# Patient Record
Sex: MALE | Race: WHITE | NOT HISPANIC OR LATINO | Employment: FULL TIME | URBAN - METROPOLITAN AREA
[De-identification: names, ages, dates, MRNs, and addresses within clinical notes are randomized per-mention and may not be internally consistent; named-entity substitution may affect disease eponyms.]

---

## 2017-04-13 ENCOUNTER — OFFICE VISIT (OUTPATIENT)
Dept: FAMILY MEDICINE | Facility: CLINIC | Age: 47
End: 2017-04-13
Payer: COMMERCIAL

## 2017-04-13 VITALS
BODY MASS INDEX: 27.5 KG/M2 | HEART RATE: 100 BPM | WEIGHT: 203 LBS | TEMPERATURE: 98.6 F | DIASTOLIC BLOOD PRESSURE: 66 MMHG | HEIGHT: 72 IN | SYSTOLIC BLOOD PRESSURE: 98 MMHG

## 2017-04-13 DIAGNOSIS — Z13.6 CARDIOVASCULAR SCREENING; LDL GOAL LESS THAN 130: Chronic | ICD-10-CM

## 2017-04-13 DIAGNOSIS — H81.10 BPPV (BENIGN PAROXYSMAL POSITIONAL VERTIGO), UNSPECIFIED LATERALITY: Primary | ICD-10-CM

## 2017-04-13 DIAGNOSIS — R03.1 LOW BLOOD PRESSURE READING: ICD-10-CM

## 2017-04-13 LAB
ERYTHROCYTE [DISTWIDTH] IN BLOOD BY AUTOMATED COUNT: 13.4 % (ref 10–15)
HCT VFR BLD AUTO: 47.7 % (ref 40–53)
HGB BLD-MCNC: 16.4 G/DL (ref 13.3–17.7)
MCH RBC QN AUTO: 30.6 PG (ref 26.5–33)
MCHC RBC AUTO-ENTMCNC: 34.4 G/DL (ref 31.5–36.5)
MCV RBC AUTO: 89 FL (ref 78–100)
PLATELET # BLD AUTO: 205 10E9/L (ref 150–450)
RBC # BLD AUTO: 5.36 10E12/L (ref 4.4–5.9)
WBC # BLD AUTO: 8.2 10E9/L (ref 4–11)

## 2017-04-13 PROCEDURE — 80061 LIPID PANEL: CPT | Performed by: FAMILY MEDICINE

## 2017-04-13 PROCEDURE — 99214 OFFICE O/P EST MOD 30 MIN: CPT | Performed by: FAMILY MEDICINE

## 2017-04-13 PROCEDURE — 85027 COMPLETE CBC AUTOMATED: CPT | Performed by: FAMILY MEDICINE

## 2017-04-13 PROCEDURE — 80048 BASIC METABOLIC PNL TOTAL CA: CPT | Performed by: FAMILY MEDICINE

## 2017-04-13 PROCEDURE — 84443 ASSAY THYROID STIM HORMONE: CPT | Performed by: FAMILY MEDICINE

## 2017-04-13 PROCEDURE — 36415 COLL VENOUS BLD VENIPUNCTURE: CPT | Performed by: FAMILY MEDICINE

## 2017-04-13 NOTE — PATIENT INSTRUCTIONS
Benign Paroxysmal Positional Vertigo  Benign paroxysmal positional vertigo (BPPV) is a problem with the inner ear. The inner ear contains the vestibular system. This system is what helps you keep your balance. BPPV causes a feeling of spinning. It is a common problem of the vestibular system.  Understanding the vestibular system  The vestibular system of the ear is made up of very tiny parts. They include the utricle, saccule, and semicircular canals. The utricle is a tiny organ that contains calcium crystals. In some people, the crystals can move into the semicircular canals. When this happens, the system no longer works as it should. This causes BPPV. Benign means it is not life-threatening. Paroxysmal means it happens suddenly. Positional means that it happens when you move your head. Vertigo is a feeling of spinning.  What causes BPPV?  Causes include injury to your head or neck. Other problems with the vestibular system may cause BPPV. In many people, the cause of BPPV is not known.  Symptoms of BPPV  You many have repeated feelings of spinning (vertigo). The vertigo usually lasts less than 1 minute. Some movements, suchas rolling over in bed, can bring on vertigo.  Diagnosing BPPV  Your primary health care provider may diagnose and treat your BPPV. Or you may see an ear, nose, and throat doctor (otolaryngologist). In some cases, you may see a nervous system doctor (neurologist).  The health care provider will ask about your symptoms and your medical history. He or she will examine you. You may have hearing and balance tests. As part of the exam, your health care provider may have you move your head and body in certain ways. If you have BPPV, the movements can bring on vertigo. Your provider will also look for abnormal movements of your eyes. You may have other tests to check your vestibular or nervous systems.  Treatment for BPPV  Your health care provider may try to move the calcium crystals. This is done  by having you move your head and neck in certain ways. This treatment is safe and often works well. You may also be told to do these movements at home. You may still have vertigo for a few weeks. Your health care provider will recheck your symptoms, usually in about a month. Special physical therapy may also be part of treatment.  In rare cases surgery may be needed for BPPV that does not go away.     When to call the health care provider  Call your health care provider right away if you have any of these:    Symptoms that do not go away with treatment    Symptoms that get worse    New symptoms        [id number] [employer]. [address] [place] [address]. All rights reserved. This information is not intended as a substitute for professional medical care. Always follow your healthcare professional's instructions.      Look for information online(like a youtube video) on the Epley maneuver, I think it is your left ear that is causing the problem.    [facility]   Discharged by : Anna SAMAYOA CMA ([facility])    Paper scripts provided to patient : none      If you have any questions regarding your visit please contact your care team:     [facility] Clinic Hours Telephone Number   Dr. Geetha Turner, PA-C  Dr. Enoc Trejo   7am-7pm Monday - Thursday   7am-5pm Fridays  [phone]   (Appointment scheduling available 24/7)   RN Line   [phone] option 2       For a Price Quote for your services, please call our Consumer Price Line at [phone].     What options do I have for visits at the clinic other than the traditional office visit?     To expand how we care for you, many of our providers are utilizing electronic visits (e-visits) and telephone visits, when medically appropriate, for interactions with their patients rather than a visit in the clinic. We also offer nurse visits for many medical concerns. Just like any  other service, we will bill your insurance company for this type of visit based on time spent on the phone with your provider. Not all insurance companies cover these visits. Please check with your medical insurance if this type of visit is covered. You will be responsible for any charges that are not paid by your insurance.   E-visits via 3rd Planethart: generally incur a $35.00 fee.     Telephone visits:   Time spent on the phone: *charged based on time that is spent on the phone in increments of 10 minutes. Estimated cost:   5-10 mins $30.00   11-20 mins. $59.00   21-30 mins. $85.00     Use Combine[employer] (secure email communication and access to your chart) to send your primary care provider a message or make an appointment. Ask someone on your Team how to sign up for CombineNet.     As always, Thank you for trusting us with your health care needs!      [facility]:    Scheduling Appointments  [facility]  Call: [phone]    [facility]  Call: [phone]    [facility]  Call: [phone]      WHERE TO GO FOR CARE?    Clinic    Make an appointment if you:       Are sick (cold, cough, flu, sore throat, earache or in pain).       Have a small injury (sprain, small cut, burn or broken bone).       Need a physical exam, Pap smear, vaccine or prescription refill.       Have questions about your health or medicines.    To reach us:      Call [phone] ([phone]). Open 24 hours every day. (For counseling services, call [phone].)    Log into CombineNet at [email or web address]. (Visit [email or web address] to create an account.) Hospital emergency room    An emergency is a serious or life- threatening problem that must be treated right away.    Call 683 or get to the hospital if you have:      Very bad or sudden:            - Chest pain or pressure         - Bleeding         - Head or belly pain         - Dizziness or trouble  seeing, walking or                          Speaking      Problems breathing      Blood in your vomit or you are coughing up blood      A major injury (knocked out, loss of a finger or limb, rape, broken bone protruding from skin)    A mental health crisis. (Or call the Mental Health Crisis line at 1-251.259.1503 or Suicide Prevention Hotline at 1-405.469.7601.)    Open 24 hours every day. You don't need an appointment.     Urgent care    Visit urgent care for sickness or small injuries when the clinic is closed. You don't need an appointment. To check hours or find an urgent care near you, visit [email or web address]. Online care    Get online care from [employer] for more than 70 common problems, like colds, allergies and infections. Open 24 hours every day at: [email or web address]   Need help deciding?    For advice about where to be seen, you may call your clinic and ask to speak with a nurse. We're here for you 24 hours every day.         If you are deaf or hard of hearing, please let us know. We provide many free services including sign language interpreters, oral interpreters, TTYs, telephone amplifiers, note takers and written materials.

## 2017-04-13 NOTE — NURSING NOTE
Chief Complaint   Patient presents with     Dizziness     for the past 3 weeks     Sleep Apnea     blood pressure issues     blood pressures from 86/62 through 140/100       Initial BP 98/66  Pulse 100  Temp 98.6  F (37  C) (Oral)  Ht 6' (1.829 m)  Wt 203 lb (92.1 kg)  BMI 27.53 kg/m2 Estimated body mass index is 27.53 kg/(m^2) as calculated from the following:    Height as of this encounter: 6' (1.829 m).    Weight as of this encounter: 203 lb (92.1 kg).  Medication Reconciliation: complete   Anna Cobb CMA (AAMA)

## 2017-04-13 NOTE — MR AVS SNAPSHOT
After Visit Summary   4/13/2017    Arslan Mixon    MRN: [id number]           Patient Information     Date Of Birth          1970        Visit Information        Provider Department      4/13/2017 4:00 PM Enoc Trejo MD [facility]        Today's Diagnoses     BPPV (benign paroxysmal positional vertigo), unspecified laterality    -  1    CARDIOVASCULAR SCREENING; LDL GOAL LESS THAN 130        Low blood pressure reading          Care Instructions      Benign Paroxysmal Positional Vertigo  Benign paroxysmal positional vertigo (BPPV) is a problem with the inner ear. The inner ear contains the vestibular system. This system is what helps you keep your balance. BPPV causes a feeling of spinning. It is a common problem of the vestibular system.  Understanding the vestibular system  The vestibular system of the ear is made up of very tiny parts. They include the utricle, saccule, and semicircular canals. The utricle is a tiny organ that contains calcium crystals. In some people, the crystals can move into the semicircular canals. When this happens, the system no longer works as it should. This causes BPPV. Benign means it is not life-threatening. Paroxysmal means it happens suddenly. Positional means that it happens when you move your head. Vertigo is a feeling of spinning.  What causes BPPV?  Causes include injury to your head or neck. Other problems with the vestibular system may cause BPPV. In many people, the cause of BPPV is not known.  Symptoms of BPPV  You many have repeated feelings of spinning (vertigo). The vertigo usually lasts less than 1 minute. Some movements, suchas rolling over in bed, can bring on vertigo.  Diagnosing BPPV  Your primary health care provider may diagnose and treat your BPPV. Or you may see an ear, nose, and throat doctor (otolaryngologist). In some cases, you may see a nervous system doctor (neurologist).  The health care provider will  ask about your symptoms and your medical history. He or she will examine you. You may have hearing and balance tests. As part of the exam, your health care provider may have you move your head and body in certain ways. If you have BPPV, the movements can bring on vertigo. Your provider will also look for abnormal movements of your eyes. You may have other tests to check your vestibular or nervous systems.  Treatment for BPPV  Your health care provider may try to move the calcium crystals. This is done by having you move your head and neck in certain ways. This treatment is safe and often works well. You may also be told to do these movements at home. You may still have vertigo for a few weeks. Your health care provider will recheck your symptoms, usually in about a month. Special physical therapy may also be part of treatment.  In rare cases surgery may be needed for BPPV that does not go away.     When to call the health care provider  Call your health care provider right away if you have any of these:    Symptoms that do not go away with treatment    Symptoms that get worse    New symptoms        [id number] [employer] [address] [place] [address]. All rights reserved. This information is not intended as a substitute for professional medical care. Always follow your healthcare professional's instructions.      Look for information online(like a youtube video) on the Epley maneuver, I think it is your left ear that is causing the problem.        Follow-ups after your visit        Who to contact     If you have questions or need follow up information about today's clinic visit or your schedule please contact [facility] directly at [phone].  Normal or non-critical lab and imaging results will be communicated to you by MyChart, letter or phone within 4 business days after the clinic has received the results. If you do not hear from us within 7 days, please contact the  "clinic through [employer] or phone. If you have a critical or abnormal lab result, we will notify you by phone as soon as possible.  Submit refill requests through Bluefly or call your pharmacy and they will forward the refill request to us. Please allow 3 business days for your refill to be completed.          Additional Information About Your Visit        [employer] Information     Bluefly lets you send messages to your doctor, view your test results, renew your prescriptions, schedule appointments and more. To sign up, go to [email or web address] . Click on \"Log in\" on the left side of the screen, which will take you to the Welcome page. Then click on \"Sign up Now\" on the right side of the page.     You will be asked to enter the access code listed below, as well as some personal information. Please follow the directions to create your username and password.     Your access code is: [email or web address]  Expires: 2017  4:50 PM     Your access code will  in 90 days. If you need help or a new code, please call your [facility] clinic or [phone].        Care EveryWhere ID     This is your Care EveryWhere ID. This could be used by other organizations to access your [facility] medical records  [id number]        Your Vitals Were     Pulse Temperature Height BMI (Body Mass Index)          100 98.6  F (37  C) (Oral) 6' (1.829 m) 27.53 kg/m2         Blood Pressure from Last 3 Encounters:   17 98/66   09/15/15 120/75   04/01/15 113/76    Weight from Last 3 Encounters:   17 203 lb (92.1 kg)   09/15/15 201 lb (91.2 kg)   04/01/15 202 lb (91.6 kg)              We Performed the Following     Basic metabolic panel  (Ca, Cl, CO2, Creat, Gluc, K, Na, BUN)     CBC with platelets     Lipid panel reflex to direct LDL     TSH with free T4 reflex        Primary Care Provider Office Phone # Fax #    [facility] [phone] [address] [facility] [address]        Thank you!  "    Thank you for choosing [facility]  for your care. Our goal is always to provide you with excellent care. Hearing back from our patients is one way we can continue to improve our services. Please take a few minutes to complete the written survey that you may receive in the mail after your visit with us. Thank you!             Your Updated Medication List - Protect others around you: Learn how to safely use, store and throw away your medicines at www.disposemymeds.org.          This list is accurate as of: 4/13/17  4:50 PM.  Always use your most recent med list.                   Brand Name Dispense Instructions for use    order for DME      Respironics REMSTAR AutoCPAP 12cm H2O, Mirage Fx standard nasal mask

## 2017-04-13 NOTE — PROGRESS NOTES
SUBJECTIVE:                                                    Arslan Mixon is a 47 year old male who presents to clinic today for the following health issues:      Dizziness     Onset: 3 weeks    Description:   Do you feel faint:  YES  Does it feel like the surroundings (bed, room) are moving: YES- at times   Unsteady/off balance: YES  Have you passed out or fallen: no     Intensity: severe    Progression of Symptoms:  worsening    Accompanying Signs & Symptoms:  Heart palpitations: no, 6 months ago has had stabbing chest pain for a while - on and off for a month   Nausea, vomiting: YES- nausea   Weakness in arms or legs: no   Fatigue: YES- always   Vision or speech changes: no   Ringing in ears (Tinnitus): no   Hearing Loss: no    History:   Head trauma/concussion hx: no   Previous similar symptoms: no   Recent bleeding history: no     Precipitating factors:   Worse with activity or head movement: no   Any new medications (BP?): no   Alcohol/drug abuse/withdrawal: no     Alleviating factors:   Does staying in a fixed position give relief:  no        Therapies Tried and outcome: none    * sleep apnea   * blood pressure readings are up and down 86/62 through 140/100     When asked about the start of the dizziness, it occurred when he woke up and perceived that his eyes were moving back and forth.  The next morning it was the same and it was the lights in the room, more of a sensation of movement.  Right now it is particular bad when he looks up, a problem since he is a  by training.  Will have multiple episodes of symptoms during the day and they will fade in between.  No other neuro symptoms like slurred speech, double vision, weakness or falls.  No syncope or near syncope.  No palpitations.  Some nausea with more intense episodes, no vomiting.  No headache.  No hearing loss or tinnitus.    Has also had some episodes of intermittent low blood pressures at home, similar to value in the  clinic.        Problem list and histories reviewed & adjusted, as indicated.  Additional history: as documented    Patient Active Problem List   Diagnosis     Varicocele     CARDIOVASCULAR SCREENING; LDL GOAL LESS THAN 130     Sleep disturbance     Back pain     KARISSA (obstructive sleep apnea)- moderate (AHI 15)     Past Surgical History:   Procedure Laterality Date     HERNIA REPAIR      umbilical, age 3     VASECTOMY         Social History   Substance Use Topics     Smoking status: Current Every Day Smoker     Packs/day: 1.00     Types: Cigarettes     Smokeless tobacco: Never Used     Alcohol use Yes     Family History   Problem Relation Age of Onset     Breast Cancer Mother      Hypertension Mother      CANCER Maternal Grandmother      brain tumor     CEREBROVASCULAR DISEASE Maternal Grandfather      HEART DISEASE Maternal Grandfather      3 MIs     CANCER Paternal Grandmother      kidney     HEART DISEASE Paternal Grandfather      MI,  age 56     DIABETES No family hx of          Current Outpatient Prescriptions   Medication Sig Dispense Refill     order for DME Respironics REMSTAR AutoCPAP 12cm H2O, Mirage Fx standard nasal mask       No Known Allergies    Reviewed and updated as needed this visit by clinical staff  Tobacco  Allergies  Meds  Problems  Med Hx  Surg Hx  Fam Hx  Soc Hx        Reviewed and updated as needed this visit by Provider  Tobacco  Allergies  Meds  Problems  Med Hx  Surg Hx  Fam Hx  Soc Hx          ROS:  Constitutional, HEENT, cardiovascular, pulmonary, gi and gu systems are negative, except as otherwise noted.    OBJECTIVE:                                                    BP 98/66  Pulse 100  Temp 98.6  F (37  C) (Oral)  Ht 6' (1.829 m)  Wt 203 lb (92.1 kg)  BMI 27.53 kg/m2  Body mass index is 27.53 kg/(m^2).  GENERAL: healthy, alert and no distress  EYES: Eyes grossly normal to inspection, PERRL and conjunctivae and sclerae normal  EYES: Eyes grossly normal to  inspection and fundi-normal  HENT: ear canals and TM's normal, nose and mouth without ulcers or lesions  NECK: no adenopathy, no asymmetry, masses, or scars and thyroid normal to palpation  RESP: lungs clear to auscultation - no rales, rhonchi or wheezes  CV: regular rate and rhythm, normal S1 S2, no S3 or S4, no murmur, click or rub, no peripheral edema and peripheral pulses strong  CV: regular rates and rhythm, normal S1 S2, no S3 or S4, no murmur, click or rub, peripheral pulses strong, no peripheral edema and no bruits heard neck  MS: no gross musculoskeletal defects noted, no edema  SKIN: no suspicious lesions or rashes  NEURO: Normal strength and tone, mentation intact and speech normal  NEURO: Normal strength and tone, sensory exam grossly normal, mentation intact, cranial nerves 2-12 intact, DTR's normal and symmetric 2+ and Romberg normal  PSYCH: mentation appears normal, affect normal/bright    Diagnostic Test Results:  none      ASSESSMENT/PLAN:                                                            1. BPPV (benign paroxysmal positional vertigo), unspecified laterality  Most likely cause based on his representation of the symptoms at this time.  Consider the Epley maneuver, think it might be the left ear bothering at this time.  If not helpful may consider rehab referral.    2. CARDIOVASCULAR SCREENING; LDL GOAL LESS THAN 130  Wanted to check lipids today and was fasting.  - Lipid panel reflex to direct LDL    3. Low blood pressure reading  Not clear if contributing to the his current symptoms.  Can check labs today to see if any common secondary cause.  - Basic metabolic panel  (Ca, Cl, CO2, Creat, Gluc, K, Na, BUN)  - TSH with free T4 reflex  - CBC with platelets    See Patient Instructions    Enoc Trejo MD  [facility]

## 2017-04-13 NOTE — LETTER
[facility]  [address] [place] [id number] [phone]      April 20, 2017      Arslan Mixon  [address] [address]              Dear Arslan,       Tests all look pretty good.  Thyroid function, kidney function and blood counts are normal.  The cholesterol levels are only mildly elevated and would not require treatment at this time.  Let me know if you have any other questions.                      Sincerely,    Enoc Trejo MD/damaris    Results for orders placed or performed in visit on 04/13/17   Lipid panel reflex to direct LDL   Result Value Ref Range    Cholesterol 188 <200 mg/dL    Triglycerides 152 (H) <150 mg/dL    HDL Cholesterol 33 (L) >39 mg/dL    LDL Cholesterol Calculated 125 (H) <100 mg/dL    Non HDL Cholesterol 155 (H) <130 mg/dL   Basic metabolic panel  (Ca, Cl, CO2, Creat, Gluc, K, Na, BUN)   Result Value Ref Range    Sodium 141 133 - 144 mmol/L    Potassium 5.2 3.4 - 5.3 mmol/L    Chloride 106 94 - 109 mmol/L    Carbon Dioxide 28 20 - 32 mmol/L    Anion Gap 7 3 - 14 mmol/L    Glucose 77 70 - 99 mg/dL    Urea Nitrogen 13 7 - 30 mg/dL    Creatinine 0.93 0.66 - 1.25 mg/dL    GFR Estimate 87 >60 mL/min/1.7m2    GFR Estimate If Black >90   GFR Calc   >60 mL/min/1.7m2    Calcium 9.2 8.5 - 10.1 mg/dL   TSH with free T4 reflex   Result Value Ref Range    TSH 0.79 0.40 - 4.00 mU/L   CBC with platelets   Result Value Ref Range    WBC 8.2 4.0 - 11.0 10e9/L    RBC Count 5.36 4.4 - 5.9 10e12/L    Hemoglobin 16.4 13.3 - 17.7 g/dL    Hematocrit 47.7 40.0 - 53.0 %    MCV 89 78 - 100 fl    MCH 30.6 26.5 - 33.0 pg    MCHC 34.4 31.5 - 36.5 g/dL    RDW 13.4 10.0 - 15.0 %    Platelet Count 205 150 - 450 10e9/L

## 2017-04-14 LAB
ANION GAP SERPL CALCULATED.3IONS-SCNC: 7 MMOL/L (ref 3–14)
BUN SERPL-MCNC: 13 MG/DL (ref 7–30)
CALCIUM SERPL-MCNC: 9.2 MG/DL (ref 8.5–10.1)
CHLORIDE SERPL-SCNC: 106 MMOL/L (ref 94–109)
CHOLEST SERPL-MCNC: 188 MG/DL
CO2 SERPL-SCNC: 28 MMOL/L (ref 20–32)
CREAT SERPL-MCNC: 0.93 MG/DL (ref 0.66–1.25)
GFR SERPL CREATININE-BSD FRML MDRD: 87 ML/MIN/1.7M2
GLUCOSE SERPL-MCNC: 77 MG/DL (ref 70–99)
HDLC SERPL-MCNC: 33 MG/DL
LDLC SERPL CALC-MCNC: 125 MG/DL
NONHDLC SERPL-MCNC: 155 MG/DL
POTASSIUM SERPL-SCNC: 5.2 MMOL/L (ref 3.4–5.3)
SODIUM SERPL-SCNC: 141 MMOL/L (ref 133–144)
TRIGL SERPL-MCNC: 152 MG/DL
TSH SERPL DL<=0.005 MIU/L-ACNC: 0.79 MU/L (ref 0.4–4)

## 2017-11-28 DIAGNOSIS — G47.33 OSA (OBSTRUCTIVE SLEEP APNEA): Primary | Chronic | ICD-10-CM

## 2017-11-30 ENCOUNTER — DOCUMENTATION ONLY (OUTPATIENT)
Dept: SLEEP MEDICINE | Facility: CLINIC | Age: 47
End: 2017-11-30

## 2017-11-30 NOTE — PROGRESS NOTES
PATIENT CAME IN WITH ISSUES WITH HIS REMSTAR CPAP MACHINE. SENT IN FOR REPAIRS AND GAVE HIM A LOANER. 11/29/17. HE ALSO RECEIVED SUPPLIES, NEW MASK (AIRTOUCH F20 MED.), TUBING, FILTERS AND WATER CHAMBER.   AC

## 2018-03-27 ENCOUNTER — OFFICE VISIT (OUTPATIENT)
Dept: FAMILY MEDICINE | Facility: CLINIC | Age: 48
End: 2018-03-27
Payer: COMMERCIAL

## 2018-03-27 ENCOUNTER — TELEPHONE (OUTPATIENT)
Dept: FAMILY MEDICINE | Facility: CLINIC | Age: 48
End: 2018-03-27

## 2018-03-27 VITALS
SYSTOLIC BLOOD PRESSURE: 112 MMHG | WEIGHT: 212 LBS | TEMPERATURE: 98.3 F | HEIGHT: 72 IN | HEART RATE: 80 BPM | BODY MASS INDEX: 28.71 KG/M2 | DIASTOLIC BLOOD PRESSURE: 74 MMHG

## 2018-03-27 DIAGNOSIS — R31.0 GROSS HEMATURIA: Primary | ICD-10-CM

## 2018-03-27 LAB
ALBUMIN UR-MCNC: NEGATIVE MG/DL
APPEARANCE UR: CLEAR
BILIRUB UR QL STRIP: NEGATIVE
COLOR UR AUTO: YELLOW
GLUCOSE UR STRIP-MCNC: NEGATIVE MG/DL
HGB UR QL STRIP: ABNORMAL
KETONES UR STRIP-MCNC: NEGATIVE MG/DL
LEUKOCYTE ESTERASE UR QL STRIP: NEGATIVE
NITRATE UR QL: NEGATIVE
NON-SQ EPI CELLS #/AREA URNS LPF: ABNORMAL /LPF
PH UR STRIP: 7 PH (ref 5–7)
RBC #/AREA URNS AUTO: ABNORMAL /HPF
SOURCE: ABNORMAL
SP GR UR STRIP: 1.02 (ref 1–1.03)
UROBILINOGEN UR STRIP-ACNC: 0.2 EU/DL (ref 0.2–1)
WBC #/AREA URNS AUTO: ABNORMAL /HPF

## 2018-03-27 PROCEDURE — 87086 URINE CULTURE/COLONY COUNT: CPT | Performed by: INTERNAL MEDICINE

## 2018-03-27 PROCEDURE — 81001 URINALYSIS AUTO W/SCOPE: CPT | Performed by: INTERNAL MEDICINE

## 2018-03-27 PROCEDURE — 99213 OFFICE O/P EST LOW 20 MIN: CPT | Performed by: INTERNAL MEDICINE

## 2018-03-27 NOTE — TELEPHONE ENCOUNTER
Reason for call:  Patient reporting a symptom virgil called for the patient    Symptom or request: blood in urine    Duration (how long have symptoms been present): 2 days     Have you been treated for this before? No    Additional comments: offered apt today with [place] wife wouldn't take it would like to be fit in with a male provider and would like to know how soon should the patient be seen. can he wait to see David 4/9/18? Please call to discuss     Phone Number patient can be reached at:  Home number on file [phone]    Best Time:  any    Can we leave a detailed message on this number:  YES    Call taken on 3/27/2018 at 7:44 AM by Ivonne Gautam

## 2018-03-27 NOTE — MR AVS SNAPSHOT
After Visit Summary   3/27/2018    Arslan Mixon    MRN: [id number]           Patient Information     Date Of Birth          1970        Visit Information        Provider Department      3/27/2018 2:40 PM Shara Tai MD [facility]        Today's Diagnoses     Gross hematuria    -  1       Follow-ups after your visit        Additional Services     UROLOGY ADULT REFERRAL       Your provider has referred you to: [facility]: [facility] [phone]   [email or web address]    Please be aware that coverage of these services is subject to the terms and limitations of your health insurance plan.  Call member services at your health plan with any benefit or coverage questions.      Please bring the following with you to your appointment:    (1) Any X-Rays, CTs or MRIs which have been performed.  Contact the facility where they were done to arrange for  prior to your scheduled appointment.    (2) List of current medications  (3) This referral request   (4) Any documents/labs given to you for this referral                  Follow-up notes from your care team     Return in about 1 week (around 4/3/2018) for with urologist.      Who to contact     If you have questions or need follow up information about today's clinic visit or your schedule please contact [facility] directly at [phone].  Normal or non-critical lab and imaging results will be communicated to you by MyChart, letter or phone within 4 business days after the clinic has received the results. If you do not hear from us within 7 days, please contact the clinic through MyChart or phone. If you have a critical or abnormal lab result, we will notify you by phone as soon as possible.  Submit refill requests through Contact At Once! or call your pharmacy and they will forward the refill request to us. Please allow 3 business days for your refill to  "be completed.          Additional Information About Your Visit        ChayamuniharNepris Information     [employer] lets you send messages to your doctor, view your test results, renew your prescriptions, schedule appointments and more. To sign up, go to [email or web address] . Click on \"Log in\" on the left side of the screen, which will take you to the Welcome page. Then click on \"Sign up Now\" on the right side of the page.     You will be asked to enter the access code listed below, as well as some personal information. Please follow the directions to create your username and password.     Your access code is: [email or web address]  Expires: 2018  3:07 PM     Your access code will  in 90 days. If you need help or a new code, please call your [place] clinic or [phone].        Care EveryWhere ID     This is your [facility] EveryWhere ID. This could be used by other organizations to access your [place] medical records  [id number]        Your Vitals Were     Pulse Temperature Height BMI (Body Mass Index)          80 98.3  F (36.8  C) (Oral) 6' (1.829 m) 28.75 kg/m2         Blood Pressure from Last 3 Encounters:   18 112/74   17 98/66   09/15/15 120/75    Weight from Last 3 Encounters:   18 212 lb (96.2 kg)   17 203 lb (92.1 kg)   09/15/15 201 lb (91.2 kg)              We Performed the Following     *UA reflex to Microscopic     Urine Culture Aerobic Bacterial     Urine Microscopic     UROLOGY ADULT REFERRAL        Primary Care Provider Office Phone # Fax #    [facility] [phone]       [address] [place] [address]        Equal Access to Services     JOEL MANZANARES : Jason Plaza, raoul bazzi, jennifer kaalmaluis madera, srikanth Blackmon [facility] [phone].    ATENCIÓN: Si habla español, tiene a hernandez disposición servicios gratuitos de asistencia lingüística. Llame al [phone].    We comply with applicable " federal civil rights laws and [place] laws. We do not discriminate on the basis of race, color, national origin, age, disability, sex, sexual orientation, or gender identity.            Thank you!     Thank you for choosing [facility]  for your care. Our goal is always to provide you with excellent care. Hearing back from our patients is one way we can continue to improve our services. Please take a few minutes to complete the written survey that you may receive in the mail after your visit with us. Thank you!             Your Updated Medication List - Protect others around you: Learn how to safely use, store and throw away your medicines at www.disposemymeds.org.          This list is accurate as of 3/27/18  3:07 PM.  Always use your most recent med list.                   Brand Name Dispense Instructions for use Diagnosis    order for DME      Respironics REMSTAR AutoCPAP 12cm H2O, Mirage Fx standard nasal mask

## 2018-03-27 NOTE — LETTER
[address] [id number] [phone]                                                                                                March 28, 2018    Arslan Mixon  [address] [address]        Dear Mr. Mixon,    Your urine culture was negative for urine or bladder infection.     Follow up with the urologist like we discussed at your visit.       Please contact the clinic if you have additional questions.  Thank you      Sincerely,      Shara Tai MD/cris    Results for orders placed or performed in visit on 03/27/18   *UA reflex to Microscopic   Result Value Ref Range    Color Urine Yellow     Appearance Urine Clear     Glucose Urine Negative NEG^Negative mg/dL    Bilirubin Urine Negative NEG^Negative    Ketones Urine Negative NEG^Negative mg/dL    Specific Gravity Urine 1.020 1.003 - 1.035    Blood Urine Large (A) NEG^Negative    pH Urine 7.0 5.0 - 7.0 pH    Protein Albumin Urine Negative NEG^Negative mg/dL    Urobilinogen Urine 0.2 0.2 - 1.0 EU/dL    Nitrite Urine Negative NEG^Negative    Leukocyte Esterase Urine Negative NEG^Negative    Source Midstream Urine    Urine Microscopic   Result Value Ref Range    WBC Urine 0 - 5 OTO5^0 - 5 /HPF    RBC Urine 10-25 (A) OTO2^O - 2 /HPF    Squamous Epithelial /LPF Urine Few FEW^Few /LPF   Urine Culture Aerobic Bacterial   Result Value Ref Range    Specimen Description Midstream Urine     Culture Micro No growth

## 2018-03-27 NOTE — TELEPHONE ENCOUNTER
"Arslan Mixon is a 48 year old male who calls with hematuria.    NURSING ASSESSMENT:  Description:  Patient has had blood in his urine for 2 days. He denies recent trauma to back or abdomen, fever, flank or back pain, or urinary stent present.   Onset/duration:  2 days  Precip. factors:  n/a  Associated symptoms:  See description  Improves/worsens symptoms:  n/a  Pain scale (0-10)   0/10  Allergies: No Known Allergies    NURSING PLAN: Nursing advice to patient see provider today    RECOMMENDED DISPOSITION:  See provider today  Will comply with recommendation: Yes  If further questions/concerns or if symptoms do not improve, worsen or new symptoms develop, call your PCP or [facility] as soon as possible.      Guideline used:  Telephone Triage Protocols for Nurses, Fifth Edition, Nay Piña \"Urine, Abnormal Color\"    Deepak Lyons RN      "

## 2018-03-27 NOTE — PROGRESS NOTES
SUBJECTIVE:  Arslan Mixon is an 48 year old male who presents for blood in urine.  Noticed it two days ago and has continued about the same since onset.  No pain with urination.  No sense of irritation.  No increased frequency.  Does have some back pain from sleeping for a while on the floor, but not seem related to the urine sxs.  No fevers, chills, sweats.  No recent travel.  No concerns for stds.  No drainage or discharge from penis no sores on penis.  No hard impacts from accidents or wrestling or playing with kids.  Has tried to drink more fluids since started.  No meds taken for sxs.  Takes tylenol a couple times a week for pain, but not take aspirin or ibuprofen.        PMH: neg.    PSH: hernia surgery at age 3.    Social History     Social History     Marital status:      Spouse name: N/A     Number of children: N/A     Years of education: N/A     Occupational History           Social History Main Topics     Smoking status: Current Every Day Smoker     Packs/day: 1.00     Types: Cigarettes     Smokeless tobacco: Never Used     Alcohol use Yes     Drug use: No     Sexual activity: Yes     Partners: Female     Other Topics Concern     Parent/Sibling W/ Cabg, Mi Or Angioplasty Before 65f 55m? No     Social History Narrative    Live at home with spouse and 3 sons.     Family History   Problem Relation Age of Onset     Breast Cancer Mother      Hypertension Mother      CANCER Maternal Grandmother      brain tumor     CEREBROVASCULAR DISEASE Maternal Grandfather      HEART DISEASE Maternal Grandfather      3 MIs     CANCER Paternal Grandmother      kidney     HEART DISEASE Paternal Grandfather      MI,  age 56     DIABETES No family hx of        ALLERGIES:  Review of patient's allergies indicates no known allergies.    Current Outpatient Prescriptions   Medication     order for DME     No current facility-administered medications for this visit.          ROS:  ROS is done and is negative for  general, constitutional, eye, ENT, Respiratory, cardiovascular, GI, , Skin, musculoskeletal except as noted elsewhere.      OBJECTIVE:  /74  Pulse 80  Temp 98.3  F (36.8  C) (Oral)  Ht 6' (1.829 m)  Wt 212 lb (96.2 kg)  BMI 28.75 kg/m2  GENERAL APPEARANCE: Alert, in no acute distress  EYES: normal  NOSE:normal  OROPHARYNX:normal  NECK:No adenopathy,masses or thyromegaly  RESP: normal and clear to auscultation  CV:regular rate and rhythm and no murmurs, clicks, or gallops  ABDOMEN: Abdomen soft, non-tender. BS normal. No masses, organomegaly  SKIN: no ulcers, lesions or rash  MUSCULOSKELETAL:Musculoskeletal normal  BACK: non-tender, no cva tenderness.    RESULTS  Results for orders placed or performed in visit on 03/27/18   *UA reflex to Microscopic   Result Value Ref Range    Color Urine Yellow     Appearance Urine Clear     Glucose Urine Negative NEG^Negative mg/dL    Bilirubin Urine Negative NEG^Negative    Ketones Urine Negative NEG^Negative mg/dL    Specific Gravity Urine 1.020 1.003 - 1.035    Blood Urine Large (A) NEG^Negative    pH Urine 7.0 5.0 - 7.0 pH    Protein Albumin Urine Negative NEG^Negative mg/dL    Urobilinogen Urine 0.2 0.2 - 1.0 EU/dL    Nitrite Urine Negative NEG^Negative    Leukocyte Esterase Urine Negative NEG^Negative    Source Midstream Urine    Urine Microscopic   Result Value Ref Range    WBC Urine PENDING OTO5^0 - 5 /HPF    RBC Urine 10-25 (A) OTO2^O - 2 /HPF    Squamous Epithelial /LPF Urine Few FEW^Few /LPF   .  No results found for this or any previous visit (from the past 48 hour(s)).    ASSESSMENT/PLAN:    ASSESSMENT / PLAN:  (R31.0) Gross hematuria  (primary encounter diagnosis)  Comment: UA shows RBCs, but no other findings that would indicate a UTI.   Plan: *UA reflex to Microscopic, Urine Microscopic,         UROLOGY ADULT REFERRAL, Urine Culture Aerobic         Bacterial        Will check urine cx to fully evaluate for UTI etiology of hematuria, but pt doesn't have  sxs c/w uti such as dysuria, frequency, etc.  Referred to urology for further evaluation.  Advised pt to be seen soon, even if he no longer sees blood in the urine.  Pt agrees with plan.  I reviewed signs of concern.   Reviewed red flag symptoms and is to go to the ER if experiences any of these.      See [facility] for orders, medications, letters, patient instructions    Shara Tai M.D.

## 2018-03-28 LAB
BACTERIA SPEC CULT: NO GROWTH
SPECIMEN SOURCE: NORMAL

## 2018-04-10 ENCOUNTER — OFFICE VISIT (OUTPATIENT)
Dept: UROLOGY | Facility: CLINIC | Age: 48
End: 2018-04-10
Payer: COMMERCIAL

## 2018-04-10 VITALS — DIASTOLIC BLOOD PRESSURE: 88 MMHG | SYSTOLIC BLOOD PRESSURE: 130 MMHG | RESPIRATION RATE: 14 BRPM | HEART RATE: 80 BPM

## 2018-04-10 DIAGNOSIS — R31.0 GROSS HEMATURIA: Primary | ICD-10-CM

## 2018-04-10 DIAGNOSIS — Z72.0 TOBACCO ABUSE: ICD-10-CM

## 2018-04-10 LAB
ALBUMIN UR-MCNC: NEGATIVE MG/DL
APPEARANCE UR: CLEAR
BILIRUB UR QL STRIP: NEGATIVE
COLOR UR AUTO: YELLOW
GLUCOSE UR STRIP-MCNC: NEGATIVE MG/DL
HGB UR QL STRIP: ABNORMAL
KETONES UR STRIP-MCNC: NEGATIVE MG/DL
LEUKOCYTE ESTERASE UR QL STRIP: NEGATIVE
NITRATE UR QL: NEGATIVE
PH UR STRIP: 5.5 PH (ref 5–7)
RBC #/AREA URNS AUTO: ABNORMAL /HPF
SOURCE: ABNORMAL
SP GR UR STRIP: >1.03 (ref 1–1.03)
UROBILINOGEN UR STRIP-ACNC: 0.2 EU/DL (ref 0.2–1)
WBC #/AREA URNS AUTO: ABNORMAL /HPF

## 2018-04-10 PROCEDURE — 99244 OFF/OP CNSLTJ NEW/EST MOD 40: CPT | Performed by: UROLOGY

## 2018-04-10 PROCEDURE — 81001 URINALYSIS AUTO W/SCOPE: CPT | Performed by: UROLOGY

## 2018-04-10 PROCEDURE — 88112 CYTOPATH CELL ENHANCE TECH: CPT | Performed by: UROLOGY

## 2018-04-10 RX ORDER — NICOTINE 21 MG/24HR
1 PATCH, TRANSDERMAL 24 HOURS TRANSDERMAL EVERY 24 HOURS
Qty: 30 PATCH | Refills: 1 | Status: SHIPPED | OUTPATIENT
Start: 2018-04-10 | End: 2021-08-05

## 2018-04-10 NOTE — PROGRESS NOTES
Urology Note            S:  Arslan Mixon is a 48 year old male who was seen in a consultation at the request of Dr. Tai for hematuria.   Patient has gross hematuria off and on for several weeks.  He has no other voiding symptoms in addition to hematuria.  He has no flank pain.  He has no history of kidney stone.  He denies any trauma.  Recent UA showed 2-5 rbc/hpf.  He is a smoker.  No past medical history on file.  Current Outpatient Prescriptions   Medication Sig Dispense Refill     order for DME Respironics REMSTAR AutoCPAP 12cm H2O, Mirage Fx standard nasal mask       Past Surgical History:   Procedure Laterality Date     HERNIA REPAIR      umbilical, age 3     VASECTOMY        Social History     Social History     Marital status:      Spouse name: N/A     Number of children: N/A     Years of education: N/A     Occupational History           Social History Main Topics     Smoking status: Current Every Day Smoker     Packs/day: 1.00     Types: Cigarettes     Smokeless tobacco: Never Used     Alcohol use Yes     Drug use: No     Sexual activity: Yes     Partners: Female     Other Topics Concern     Parent/Sibling W/ Cabg, Mi Or Angioplasty Before 65f 55m? No     Social History Narrative    Live at home with spouse and 3 sons.     Family History   Problem Relation Age of Onset     Breast Cancer Mother      Hypertension Mother      CANCER Maternal Grandmother      brain tumor     CEREBROVASCULAR DISEASE Maternal Grandfather      HEART DISEASE Maternal Grandfather      3 MIs     CANCER Paternal Grandmother      kidney     HEART DISEASE Paternal Grandfather      MI,  age 56     DIABETES No family hx of           REVIEW OF SYSTEMS  =================  C: NEGATIVE for fever, chills, change in weight  I: NEGATIVE for worrisome rashes, moles or lesions  E/M: NEGATIVE for ear, mouth and throat problems  R: NEGATIVE for significant cough or SHORTNESS OF BREATH  CV:  NEGATIVE for chest pain,  palpitations or peripheral edema  GI: NEGATIVE for nausea, abdominal pain, heartburn, or change in bowel habits  NEURO: NEGATIVE numbness/weakness  : see HPI  PSYCH: NEGATIVE depression/anxiety  LYmph: no new enlarged lymph nodes  Ortho: no new trauma/movements           O: Exam:  Constitutional: healthy, alert and no distress  Head: Normocephalic. No masses, lesions, tenderness or abnormalities  ENT: ENT exam normal, no neck nodes or sinus tenderness  Cardiovascular: negative, PMI normal.   Respiratory: negative, no evidence of respiratory distress  Gastrointestinal: Abdomen soft, non-tender. BS normal. No masses, organomegaly  : no cva tenderness  Musculoskeletal: extremities normal- no gross deformities noted, gait normal and normal muscle tone  Skin: no suspicious lesions or rashes  Neurologic: Alert and oriented  Psychiatric: mentation appears normal. and affect normal/bright  Hematologic/Lymphatic/Immunologic: normal ant/post cervical, axillary, supraclavicular and inguinal nodes    Assessment:  Hematuria, gross with long h/o smoking    Recommendation: Schedule patient for CT urogram/cysto/urine cytology next.

## 2018-04-10 NOTE — PATIENT INSTRUCTIONS
Your cystoscopy is scheduled 4/23/2018 at 8:30. No preparation necessary. Please call  if you need to reschedule this appointment. Please complete your CT Scan sometime prior to your appointment for cystoscopy.   Please call  to schedule the CT scan at [facility], [address] [place] [address].

## 2018-04-10 NOTE — MR AVS SNAPSHOT
After Visit Summary   4/10/2018    Arslan Mixon    MRN: [id number]           Patient Information     Date Of Birth          1970        Visit Information        Provider Department      4/10/2018 2:15 PM Enoc Marks MD [facility]        Today's Diagnoses     Hematuria    -  1      Care Instructions    Your cystoscopy is scheduled 4/23/2018 at 8:30. No preparation necessary. Please call  if you need to reschedule this appointment. Please complete your CT Scan sometime prior to your appointment for cystoscopy.   Please call  to schedule the CT scan at [facility]/[address].            Follow-ups after your visit        Your next 10 appointments already scheduled     Apr 23, 2018  8:30 AM CDT   Return Visit with Enoc Marks MD, [facility] CYSTO PROC ROOM   [facility] ([facility])    [address] [place] [id number]   [phone]              Future tests that were ordered for you today     Open Future Orders        Priority Expected Expires Ordered    CT Abdomen Pelvis Hematuria w/wo IV Contrast Routine 4/11/2018 6/9/2018 4/10/2018            Who to contact     If you have questions or need follow up information about today's clinic visit or your schedule please contact [facility] directly at [phone].  Normal or non-critical lab and imaging results will be communicated to you by MyChart, letter or phone within 4 business days after the clinic has received the results. If you do not hear from us within 7 days, please contact the clinic through MyChart or phone. If you have a critical or abnormal lab result, we will notify you by phone as soon as possible.  Submit refill requests through [employer] or call your pharmacy and they will forward the refill request to us. Please allow 3 business days for your refill to be completed.          Additional  "Information About Your Visit        MyChart Information     [employer]Ticket lets you send messages to your doctor, view your test results, renew your prescriptions, schedule appointments and more. To sign up, go to [email or web address] . Click on \"Log in\" on the left side of the screen, which will take you to the Welcome page. Then click on \"Sign up Now\" on the right side of the page.     You will be asked to enter the access code listed below, as well as some personal information. Please follow the directions to create your username and password.     Your access code is: [email or web address]  Expires: 2018  3:07 PM     Your access code will  in 90 days. If you need help or a new code, please call your [place] clinic or [phone].        Care EveryWhere ID     This is your [facility] EveryWhere ID. This could be used by other organizations to access your [place] medical records  [id number]        Your Vitals Were     Pulse Respirations                80 14           Blood Pressure from Last 3 Encounters:   04/10/18 130/88   18 112/74   17 98/66    Weight from Last 3 Encounters:   18 96.2 kg (212 lb)   17 92.1 kg (203 lb)   09/15/15 91.2 kg (201 lb)              We Performed the Following     Cytology non gyn     UA reflex to Microscopic and Culture     Urine Microscopic        Primary Care Provider Office Phone # Fax #    [place] [facility] [phone]       [facility] [address]        Equal Access to Services     JOEL MANZANARES : Hadii jessica suggso Sohira, waaxda luqadaha, qaybta kaalmada srikanth madera . So [facility] [phone].    ATENCIÓN: Si habla español, tiene a hernandez disposición servicios gratuitos de asistencia lingüística. Llame al [phone].    We comply with applicable federal civil rights laws and [place] laws. We do not discriminate on the basis of race, color, national origin, age, disability, " sex, sexual orientation, or gender identity.            Thank you!     Thank you for choosing [facility]  for your care. Our goal is always to provide you with excellent care. Hearing back from our patients is one way we can continue to improve our services. Please take a few minutes to complete the written survey that you may receive in the mail after your visit with us. Thank you!             Your Updated Medication List - Protect others around you: Learn how to safely use, store and throw away your medicines at www.disposemymeds.org.          This list is accurate as of 4/10/18  2:41 PM.  Always use your most recent med list.                   Brand Name Dispense Instructions for use Diagnosis    order for DME      Respironics REMSTAR AutoCPAP 12cm H2O, Mirage Fx standard nasal mask

## 2018-04-12 LAB — COPATH REPORT: NORMAL

## 2018-04-16 ENCOUNTER — RADIANT APPOINTMENT (OUTPATIENT)
Dept: CT IMAGING | Facility: CLINIC | Age: 48
End: 2018-04-16
Attending: UROLOGY
Payer: COMMERCIAL

## 2018-04-16 DIAGNOSIS — Z72.0 TOBACCO ABUSE: ICD-10-CM

## 2018-04-16 DIAGNOSIS — R31.0 GROSS HEMATURIA: ICD-10-CM

## 2018-04-16 PROCEDURE — 74178 CT ABD&PLV WO CNTR FLWD CNTR: CPT | Mod: TC

## 2018-04-16 RX ORDER — IOPAMIDOL 755 MG/ML
500 INJECTION, SOLUTION INTRAVASCULAR ONCE
Status: COMPLETED | OUTPATIENT
Start: 2018-04-16 | End: 2018-04-16

## 2018-04-16 RX ADMIN — IOPAMIDOL 95 ML: 755 INJECTION, SOLUTION INTRAVASCULAR at 16:43

## 2018-04-23 ENCOUNTER — OFFICE VISIT (OUTPATIENT)
Dept: UROLOGY | Facility: CLINIC | Age: 48
End: 2018-04-23
Payer: COMMERCIAL

## 2018-04-23 VITALS — HEART RATE: 76 BPM | RESPIRATION RATE: 12 BRPM | DIASTOLIC BLOOD PRESSURE: 80 MMHG | SYSTOLIC BLOOD PRESSURE: 120 MMHG

## 2018-04-23 DIAGNOSIS — N32.89 BLADDER MASS: ICD-10-CM

## 2018-04-23 DIAGNOSIS — R31.0 GROSS HEMATURIA: Primary | ICD-10-CM

## 2018-04-23 DIAGNOSIS — N40.0 BENIGN PROSTATIC HYPERPLASIA WITHOUT LOWER URINARY TRACT SYMPTOMS: ICD-10-CM

## 2018-04-23 LAB — PSA SERPL-MCNC: 1.03 UG/L (ref 0–4)

## 2018-04-23 PROCEDURE — 36415 COLL VENOUS BLD VENIPUNCTURE: CPT | Performed by: UROLOGY

## 2018-04-23 PROCEDURE — 84153 ASSAY OF PSA TOTAL: CPT | Performed by: UROLOGY

## 2018-04-23 PROCEDURE — 52224 CYSTOSCOPY AND TREATMENT: CPT | Performed by: UROLOGY

## 2018-04-23 PROCEDURE — 88305 TISSUE EXAM BY PATHOLOGIST: CPT | Performed by: UROLOGY

## 2018-04-23 NOTE — PROGRESS NOTES
S: Arslan Mixon is a 48 year old male returns for hematuria.    Patient is draped and prepped.  Flexible cystoscopy placed under direct vision.      The anterior urethra is normal   The prostatic urethra showed bilateral lobe enlargement.     The length is 1.5cm,  the coaptation is 1.5 cm.     In the bladder there is a tumor 04 cm on left lateral wall.  This was biopsied and then fulgurated.  CT ABDOMEN/PELVIS HEMATURIA WITH AND WITHOUT IV CONTRAST April 16, 2018 5:07 PM     HISTORY: Gross hematuria. Tobacco abuse.    TECHNIQUE: 95mL Isovue-370. CT images of the abdomen and pelvis before  and after contrast administration. Radiation dose for this scan was  reduced using automated exposure control, adjustment of the mA and/or  kV according to patient size, or iterative reconstruction technique.    COMPARISON: None.    FINDINGS: No hydronephrosis or hydroureter. No urinary tract calculi.  No renal mass. No filling defects demonstrated in the renal collecting  systems, ureters, or urinary bladder. The prostate measures 6.1 x 5.8  x 3.4 cm.    There is a subcentimeter low-density lesion in the liver (series 6,  image 20) that is statistically likely to be benign. Liver is  otherwise unremarkable. The gallbladder, spleen, pancreas, and adrenal  glands are normal. No abdominal or retroperitoneal adenopathy. No  bowel obstruction, free air, or ascites. No pelvic lymphadenopathy,  free fluid, or mass. Visualized bones are unremarkable.             Impression             IMPRESSION: Mild enlargement prostate. No other cause for hematuria  demonstrated.    EVERTON GAMEZ MD        Assessment/Plan:  (R31.0) Gross hematuria  (primary encounter diagnosis)  Comment: small tumor seen     (N40.0) Benign prostatic hyperplasia without lower urinary tract symptoms  Comment:    Plan: PSA tumor marker             (N32.89) Bladder mass  Comment:    Plan: CYSTOSCOPY W TX MINOR LESION <0.5 CM          See in one week for path  report.

## 2018-04-23 NOTE — MR AVS SNAPSHOT
"              After Visit Summary   4/23/2018    Arslan Mixon    MRN: [id number]           Patient Information     Date Of Birth          1970        Visit Information        Provider Department      4/23/2018 8:30 AM Enoc Marks MD; [facility] CYSTO PROC ROOM [facility]        Today's Diagnoses     Gross hematuria    -  1    Benign prostatic hyperplasia without lower urinary tract symptoms           Follow-ups after your visit        Your next 10 appointments already scheduled     May 01, 2018  2:45 PM CDT   Return Visit with Enoc Marks MD   [facility] ([facility]    [address] [place] [address]  [place] [address] [phone]              Who to contact     If you have questions or need follow up information about today's clinic visit or your schedule please contact [facility] directly at [phone].  Normal or non-critical lab and imaging results will be communicated to you by MyChart, letter or phone within 4 business days after the clinic has received the results. If you do not hear from us within 7 days, please contact the clinic through MyChart or phone. If you have a critical or abnormal lab result, we will notify you by phone as soon as possible.  Submit refill requests through [employer] or call your pharmacy and they will forward the refill request to us. Please allow 3 business days for your refill to be completed.          Additional Information About Your Visit        MyChart Information     [employer] lets you send messages to your doctor, view your test results, renew your prescriptions, schedule appointments and more. To sign up, go to [email or web address] . Click on \"Log in\" on the left side of the screen, which will take you to the Welcome page. Then click on \"Sign up Now\" on the right side of the page.     You will be asked to enter the access code listed below, as well as some personal information. Please follow the " directions to create your username and password.     Your access code is: [email or web address]  Expires: 2018  3:07 PM     Your access code will  in 90 days. If you need help or a new code, please call your [place] clinic or [phone].        Care EveryWhere ID     This is your Care EveryWhere ID. This could be used by other organizations to access your [place] medical records  [id number]        Your Vitals Were     Pulse Respirations                76 12           Blood Pressure from Last 3 Encounters:   18 120/80   04/10/18 130/88   18 112/74    Weight from Last 3 Encounters:   18 96.2 kg (212 lb)   17 92.1 kg (203 lb)   09/15/15 91.2 kg (201 lb)              We Performed the Following     PSA tumor marker        Primary Care Provider Office Phone # Fax #    [facility] [phone]       [facility] [address]        Equal Access to Services     [facility] : Hadii aad ku hadasho Soomaali, waaxda luqadaha, qaybta kaalmada adeegyada, waxay tamara haysylvia ramirez . So [facility] [phone].    ATENCIÓN: Si habla español, tiene a hernandez disposición servicios gratuitos de asistencia lingüística. Llame al [phone].    We comply with applicable federal civil rights laws and [place] laws. We do not discriminate on the basis of race, color, national origin, age, disability, sex, sexual orientation, or gender identity.            Thank you!     Thank you for choosing [facility] [place]  for your care. Our goal is always to provide you with excellent care. Hearing back from our patients is one way we can continue to improve our services. Please take a few minutes to complete the written survey that you may receive in the mail after your visit with us. Thank you!             Your Updated Medication List - Protect others around you: Learn how to safely use, store and throw away your medicines at www.disposemymeds.org.           This list is accurate as of 4/23/18  8:55 AM.  Always use your most recent med list.                   Brand Name Dispense Instructions for use Diagnosis    nicotine 21 MG/24HR 24 hr patch    NICODERM CQ    30 patch    Place 1 patch onto the skin every 24 hours    Gross hematuria, Tobacco abuse       order for DME      Respironics REMSTAR AutoCPAP 12cm H2O, Mirage Fx standard nasal mask

## 2018-04-24 ENCOUNTER — TELEPHONE (OUTPATIENT)
Dept: OTOLARYNGOLOGY | Facility: CLINIC | Age: 48
End: 2018-04-24

## 2018-04-24 DIAGNOSIS — R31.0 GROSS HEMATURIA: Primary | ICD-10-CM

## 2018-04-24 RX ORDER — CIPROFLOXACIN 500 MG/1
500 TABLET, FILM COATED ORAL 2 TIMES DAILY
Qty: 6 TABLET | Refills: 0 | Status: SHIPPED | OUTPATIENT
Start: 2018-04-24 | End: 2018-04-27

## 2018-04-24 NOTE — TELEPHONE ENCOUNTER
Reason for Call:  Other call back    Detailed comments: Patient was seen yesterday and Dr. Marks was going to send a prescription for an antibiotic to [facility] but they don't have it. Please advise    Phone Number Patient can be reached at: Home number on file [phone] (home)    Best Time: ASAP    Can we leave a detailed message on this number? YES    Call taken on 4/24/2018 at 11:19 AM by Radha Minaya

## 2018-04-25 LAB — COPATH REPORT: NORMAL

## 2018-05-01 ENCOUNTER — OFFICE VISIT (OUTPATIENT)
Dept: UROLOGY | Facility: CLINIC | Age: 48
End: 2018-05-01
Payer: COMMERCIAL

## 2018-05-01 VITALS — HEART RATE: 79 BPM | DIASTOLIC BLOOD PRESSURE: 79 MMHG | SYSTOLIC BLOOD PRESSURE: 138 MMHG | OXYGEN SATURATION: 96 %

## 2018-05-01 DIAGNOSIS — C67.9 MALIGNANT NEOPLASM OF URINARY BLADDER, UNSPECIFIED SITE (H): Primary | ICD-10-CM

## 2018-05-01 PROCEDURE — 99213 OFFICE O/P EST LOW 20 MIN: CPT | Performed by: UROLOGY

## 2018-05-01 NOTE — PROGRESS NOTES
No chief complaint on file.      Arslan Mixon is a 48 year old male who presents today for follow up of No chief complaint on file.   f/u post cysto and bladder biopsy.  He is doing well without complaints.    Current Outpatient Prescriptions   Medication Sig Dispense Refill     nicotine (NICODERM CQ) 21 MG/24HR 24 hr patch Place 1 patch onto the skin every 24 hours (Patient not taking: Reported on 2018) 30 patch 1     order for DME Respironics REMSTAR AutoCPAP 12cm H2O, Mirage Fx standard nasal mask       No Known Allergies   No past medical history on file.  Past Surgical History:   Procedure Laterality Date     HERNIA REPAIR      umbilical, age 3     VASECTOMY       Family History   Problem Relation Age of Onset     Breast Cancer Mother      Hypertension Mother      CANCER Maternal Grandmother      brain tumor     CEREBROVASCULAR DISEASE Maternal Grandfather      HEART DISEASE Maternal Grandfather      3 MIs     CANCER Paternal Grandmother      kidney     HEART DISEASE Paternal Grandfather      MI,  age 56     DIABETES No family hx of      Social History     Social History     Marital status:      Spouse name: N/A     Number of children: N/A     Years of education: N/A     Occupational History           Social History Main Topics     Smoking status: Former Smoker     Packs/day: 1.00     Types: Cigarettes     Quit date: 2018     Smokeless tobacco: Never Used     Alcohol use Yes     Drug use: No     Sexual activity: Yes     Partners: Female     Other Topics Concern     Parent/Sibling W/ Cabg, Mi Or Angioplasty Before 65f 55m? No     Social History Narrative    Live at home with spouse and 3 sons.       REVIEW OF SYSTEMS  =================  C: NEGATIVE for fever, chills, change in weight  I: NEGATIVE for worrisome rashes, moles or lesions  E/M: NEGATIVE for ear, mouth and throat problems  R: NEGATIVE for significant cough or SHORTNESS OF BREATH,   CV: NEGATIVE for chest pain,  palpitations or peripheral edema  GI: NEGATIVE for nausea, abdominal pain, heartburn, or change in bowel habits  NEURO: NEGATIVE any motor/sensory changes  PSYCH: NEGATIVE for recent mood disorder    Physical Exam:  /79 (BP Location: Right arm, Patient Position: Chair, Cuff Size: Adult Large)  Pulse 79  SpO2 96%   Patient is pleasant, in no acute distress, good general condition.  Lung: no evidence of respiratory distress    Abdomen: Soft, nondistended, non tender. No masses. No rebound or guarding.   Exam: normal male  Skin: Warm and dry.  No redness.  Psych: normal mood and affect  Neuro: alert and oriented  Copath Report 04/23/2018  8:50 AM 88   Patient Name: BRADLY VIDAL   MR#: [id number]   Specimen #: [id number]   Collected: 4/23/2018   Received: 4/23/2018   Reported: 4/25/2018 09:27   Ordering Phy(s): FABIÁN BETANCOURT     For improved result formatting, select 'View Enhanced Report Format' under    Linked Documents section.     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  Cystoscopy showed 0.4 cm tumor on the left lateral   wall.        Assessment/Plan:   (C67.9) Malignant neoplasm of urinary bladder, unspecified site (H)  (primary encounter diagnosis)  Comment:    Plan: BTR in 4 months

## 2018-05-01 NOTE — MR AVS SNAPSHOT
After Visit Summary   5/1/2018    Arslan Mixon    MRN: [id number]           Patient Information     Date Of Birth          1970        Visit Information        Provider Department      5/1/2018 2:45 PM Enoc Marks MD [facility]        Today's Diagnoses     Malignant neoplasm of urinary bladder, unspecified site (H)    -  1       Follow-ups after your visit        Your next 10 appointments already scheduled     Sep 04, 2018  3:00 PM CDT   Return Visit with Enoc Marks MD, [facility] CYSTO PROC ROOM   [facility] ([address] [address] [phone]              Who to contact     If you have questions or need follow up information about today's clinic visit or your schedule please contact [facility] directly at [phone].  Normal or non-critical lab and imaging results will be communicated to you by MyChart, letter or phone within 4 business days after the clinic has received the results. If you do not hear from us within 7 days, please contact the clinic through MyChart or phone. If you have a critical or abnormal lab result, we will notify you by phone as soon as possible.  Submit refill requests through [employer] or call your pharmacy and they will forward the refill request to us. Please allow 3 business days for your refill to be completed.          Additional Information About Your Visit        MyChart Information     [employer] gives you secure access to your electronic health record. If you see a primary care provider, you can also send messages to your care team and make appointments. If you have questions, please call your primary care clinic.  If you do not have a primary care provider, please call [phone] and they will assist you.        Care EveryWhere ID     This is your Care EveryWhere ID. This could be used by other organizations to access your [facility]  records  [id number]        Your Vitals Were     Pulse Pulse Oximetry                79 96%           Blood Pressure from Last 3 Encounters:   05/01/18 138/79   04/23/18 120/80   04/10/18 130/88    Weight from Last 3 Encounters:   03/27/18 96.2 kg (212 lb)   04/13/17 92.1 kg (203 lb)   09/15/15 91.2 kg (201 lb)              Today, you had the following     No orders found for display       Primary Care Provider Office Phone # Fax #    [facility] [phone]       [address] [facility] [address]        Equal Access to Services     JOEL MANZANARES : Hadii jessica suggso Sohira, waaxda luqadaha, qaybta kaalmada adeshantayada, srikanth rdz. So [facility] [phone].    ATENCIÓN: Si habla español, tiene a hernandez disposición servicios gratuitos de asistencia lingüística. Llame al [phone].    We comply with applicable federal civil rights laws and [place] laws. We do not discriminate on the basis of race, color, national origin, age, disability, sex, sexual orientation, or gender identity.            Thank you!     Thank you for choosing [facility]  for your care. Our goal is always to provide you with excellent care. Hearing back from our patients is one way we can continue to improve our services. Please take a few minutes to complete the written survey that you may receive in the mail after your visit with us. Thank you!             Your Updated Medication List - Protect others around you: Learn how to safely use, store and throw away your medicines at www.disposemymeds.org.          This list is accurate as of 5/1/18  3:32 PM.  Always use your most recent med list.                   Brand Name Dispense Instructions for use Diagnosis    nicotine 21 MG/24HR 24 hr patch    NICODERM CQ    30 patch    Place 1 patch onto the skin every 24 hours    Gross hematuria, Tobacco abuse       order for DME      Respironics REMSTAR AutoCPAP 12cm H2O,  Mirage Fx standard nasal mask

## 2018-09-18 ENCOUNTER — OFFICE VISIT (OUTPATIENT)
Dept: UROLOGY | Facility: CLINIC | Age: 48
End: 2018-09-18
Payer: COMMERCIAL

## 2018-09-18 DIAGNOSIS — Z85.51 PERSONAL HISTORY OF MALIGNANT NEOPLASM OF BLADDER: Primary | ICD-10-CM

## 2018-09-18 PROCEDURE — 52000 CYSTOURETHROSCOPY: CPT | Performed by: UROLOGY

## 2018-09-18 NOTE — MR AVS SNAPSHOT
After Visit Summary   9/18/2018    Arslan Mixon    MRN: [id number]           Patient Information     Date Of Birth          1970        Visit Information        Provider Department      9/18/2018 2:15 PM Enoc Marks MD; [facility] CYSTO PROC ROOM [facility]        Today's Diagnoses     Personal history of malignant neoplasm of bladder    -  1       Follow-ups after your visit        Your next 10 appointments already scheduled     Sep 18, 2018  2:15 PM CDT   Return Visit with Enoc Marks MD, [facility] CYSTO PROC ROOM   [facility] ([facility])    [address] [place] [address]  [place] [address]   [phone]            Estevan 15, 2019  2:30 PM CST   Return Visit with Enoc Marks MD, [facility] CYSTO PROC ROOM   [facility] ([facility])    [address] [place] [address]  [place] [address]   [phone]              Who to contact     If you have questions or need follow up information about today's clinic visit or your schedule please contact [facility] directly at [phone].  Normal or non-critical lab and imaging results will be communicated to you by MyChart, letter or phone within 4 business days after the clinic has received the results. If you do not hear from us within 7 days, please contact the clinic through MyChart or phone. If you have a critical or abnormal lab result, we will notify you by phone as soon as possible.  Submit refill requests through [employer] or call your pharmacy and they will forward the refill request to us. Please allow 3 business days for your refill to be completed.          Additional Information About Your Visit        MyChart Information     [employer] gives you secure access to your electronic health record. If you see a primary care provider, you can also send messages to your care team and make appointments. If you have questions, please call your primary care clinic.  If  you do not have a primary care provider, please call [phone] and they will assist you.        Care EveryWhere ID     This is your Care EveryWhere ID. This could be used by other organizations to access your [place] medical records  [id number]         Blood Pressure from Last 3 Encounters:   05/01/18 138/79   04/23/18 120/80   04/10/18 130/88    Weight from Last 3 Encounters:   03/27/18 96.2 kg (212 lb)   04/13/17 92.1 kg (203 lb)   09/15/15 91.2 kg (201 lb)              We Performed the Following     CYSTOURETHROSCOPY ([id number])        Primary Care Provider Office Phone # Fax #    [facility] [phone]       [address] [facility] [address]        Equal Access to Services     JOEL MANZANARES : Hadii aad ku hadasho Sohira, waaxda luqadaha, qaybta kaalmada adecharles, srikanth rdz. So [facility] [phone].    ATENCIÓN: Si habla español, tiene a hernandez disposición servicios gratuitos de asistencia lingüística. Donell al [phone].    We comply with applicable federal civil rights laws and [place] laws. We do not discriminate on the basis of race, color, national origin, age, disability, sex, sexual orientation, or gender identity.            Thank you!     Thank you for choosing [facility] [place]  for your care. Our goal is always to provide you with excellent care. Hearing back from our patients is one way we can continue to improve our services. Please take a few minutes to complete the written survey that you may receive in the mail after your visit with us. Thank you!             Your Updated Medication List - Protect others around you: Learn how to safely use, store and throw away your medicines at www.disposemymeds.org.          This list is accurate as of 9/18/18  2:09 PM.  Always use your most recent med list.                   Brand Name Dispense Instructions for use Diagnosis    nicotine 21 MG/24HR 24 hr patch    NICODERM CQ     30 patch    Place 1 patch onto the skin every 24 hours    Gross hematuria, Tobacco abuse       order for DME      Respironics REMSTAR AutoCPAP 12cm H2O, Mirage Fx standard nasal mask

## 2018-09-18 NOTE — PROGRESS NOTES
S: Arslan Mixon is a 48 year old male returns for bladder cancer surveillance.    he has history of bladder cancer Grade 1 non-invasive, Stage ta, s/p turbt on 4/ 18.     He received 0 courses of BCG therapy.    Patient is draped and prepped.  Flexible cystoscopy placed under direct vision.    Cysto:  The anterior urethra is normal   The prostatic urethra is normal.     The length is 1cm,  the coaptation is 1 cm.     In the bladder there is normal mucosa.    Assessment/Plan:  (Z85.51) Personal history of malignant neoplasm of bladder  (primary encounter diagnosis)  Comment: no evidence of cancer recurrence  Plan: CYSTOURETHROSCOPY (04989)        In 4 months

## 2019-01-15 ENCOUNTER — OFFICE VISIT (OUTPATIENT)
Dept: UROLOGY | Facility: CLINIC | Age: 49
End: 2019-01-15
Payer: COMMERCIAL

## 2019-01-15 DIAGNOSIS — Z85.51 PERSONAL HISTORY OF MALIGNANT NEOPLASM OF BLADDER: Primary | ICD-10-CM

## 2019-01-15 PROCEDURE — 52000 CYSTOURETHROSCOPY: CPT | Performed by: UROLOGY

## 2019-07-16 ENCOUNTER — OFFICE VISIT (OUTPATIENT)
Dept: UROLOGY | Facility: CLINIC | Age: 49
End: 2019-07-16
Payer: COMMERCIAL

## 2019-07-16 DIAGNOSIS — Z85.51 PERSONAL HISTORY OF MALIGNANT NEOPLASM OF BLADDER: Primary | ICD-10-CM

## 2019-07-16 PROCEDURE — 52000 CYSTOURETHROSCOPY: CPT | Performed by: UROLOGY

## 2019-07-16 NOTE — PROGRESS NOTES
S: Arslan Mixon is a 49 year old male returns for bladder cancer surveillance.    he has history of bladder cancer Grade 1 non-invasive, Stage ta, s/p turbt on 4/ 18.     He received 0 courses of BCG therapy.    Patient is draped and prepped.  Flexible cystoscopy placed under direct vision.    Cysto:  The anterior urethra is normal   The prostatic urethra is normal.     The length is 1cm,  the coaptation is 1 cm.     In the bladder there is normal mucosa.    Assessment/Plan:  (Z85.51) Personal history of malignant neoplasm of bladder  (primary encounter diagnosis)  Comment: no evidence of cancer recurrence  Plan: CYSTOURETHROSCOPY (83720)        In 6 months

## 2020-01-14 ENCOUNTER — OFFICE VISIT (OUTPATIENT)
Dept: UROLOGY | Facility: CLINIC | Age: 50
End: 2020-01-14
Payer: COMMERCIAL

## 2020-01-14 DIAGNOSIS — Z85.51 PERSONAL HISTORY OF MALIGNANT NEOPLASM OF BLADDER: Primary | ICD-10-CM

## 2020-01-14 PROCEDURE — 52000 CYSTOURETHROSCOPY: CPT | Performed by: UROLOGY

## 2020-01-14 NOTE — PROGRESS NOTES
S: Arslan Mixon is a 49 year old male returns for bladder cancer surveillance.    he has history of bladder cancer Grade 1 non-invasive, Stage ta, s/p turbt on 4/ 18.     He received 0 courses of BCG therapy.    Patient is draped and prepped.  Flexible cystoscopy placed under direct vision.    Cysto:  The anterior urethra is normal   The prostatic urethra is normal.     The length is 1cm,  the coaptation is 1 cm.     In the bladder there is normal mucosa.    Assessment/Plan:  (Z85.51) Personal history of malignant neoplasm of bladder  (primary encounter diagnosis)  Comment: no evidence of cancer recurrence  Plan: CYSTOURETHROSCOPY (84158)        In 12 months

## 2020-02-24 ENCOUNTER — HEALTH MAINTENANCE LETTER (OUTPATIENT)
Age: 50
End: 2020-02-24

## 2020-12-13 ENCOUNTER — HEALTH MAINTENANCE LETTER (OUTPATIENT)
Age: 50
End: 2020-12-13

## 2021-01-12 ENCOUNTER — OFFICE VISIT (OUTPATIENT)
Dept: UROLOGY | Facility: CLINIC | Age: 51
End: 2021-01-12
Payer: COMMERCIAL

## 2021-01-12 DIAGNOSIS — Z85.51 PERSONAL HISTORY OF MALIGNANT NEOPLASM OF BLADDER: Primary | ICD-10-CM

## 2021-01-12 PROCEDURE — 52000 CYSTOURETHROSCOPY: CPT | Performed by: UROLOGY

## 2021-01-12 NOTE — PROGRESS NOTES
S: Arslan Mixon is a 50 year old male returns for bladder cancer surveillance.    he has history of bladder cancer Grade 1 non-invasive, Stage ta, s/p turbt on 4/ 18.     He received 0 courses of BCG therapy.    Patient is draped and prepped.  Flexible cystoscopy placed under direct vision.    Cysto:  The anterior urethra is normal   The prostatic urethra is normal.     The length is 1cm,  the coaptation is 1 cm.     In the bladder there is normal mucosa.    Assessment/Plan:  (Z85.51) Personal history of malignant neoplasm of bladder  (primary encounter diagnosis)  Comment: no evidence of cancer recurrence  Plan: CYSTOURETHROSCOPY ([id number])        In 12 months

## 2021-03-09 ENCOUNTER — TELEPHONE (OUTPATIENT)
Dept: SLEEP MEDICINE | Facility: CLINIC | Age: 51
End: 2021-03-09

## 2021-03-09 NOTE — TELEPHONE ENCOUNTER
Reason for call:  Other   Patient called regarding (reason for call): call back    Additional comments: per patient , he is requesting that his sleep study records and consult emailed to him and also faxed over to his ENT [facility].     Attn: Cassidy   Fax: [phone]    Email: [email or web address]    Phone number to reach patient:  Home number on file [phone] (home)    Best Time:  Anytime     Can we leave a detailed message on this number?  NO    Travel screening: Not Applicable

## 2021-03-19 NOTE — TELEPHONE ENCOUNTER
Spoke with patient he will contact [facility] for cpap and medical records for clinic records. Both numbers provided to patient.

## 2021-04-17 ENCOUNTER — HEALTH MAINTENANCE LETTER (OUTPATIENT)
Age: 51
End: 2021-04-17

## 2021-08-05 ENCOUNTER — OFFICE VISIT (OUTPATIENT)
Dept: FAMILY MEDICINE | Facility: CLINIC | Age: 51
End: 2021-08-05
Payer: COMMERCIAL

## 2021-08-05 VITALS
DIASTOLIC BLOOD PRESSURE: 70 MMHG | TEMPERATURE: 98.2 F | BODY MASS INDEX: 28.85 KG/M2 | WEIGHT: 213 LBS | RESPIRATION RATE: 22 BRPM | HEIGHT: 72 IN | HEART RATE: 87 BPM | OXYGEN SATURATION: 96 % | SYSTOLIC BLOOD PRESSURE: 114 MMHG

## 2021-08-05 DIAGNOSIS — Z01.818 PREOP GENERAL PHYSICAL EXAM: Primary | ICD-10-CM

## 2021-08-05 DIAGNOSIS — Z11.52 ENCOUNTER FOR SCREENING FOR COVID-19: ICD-10-CM

## 2021-08-05 DIAGNOSIS — C67.9 MALIGNANT NEOPLASM OF URINARY BLADDER, UNSPECIFIED SITE (H): ICD-10-CM

## 2021-08-05 DIAGNOSIS — G47.33 OSA (OBSTRUCTIVE SLEEP APNEA): Chronic | ICD-10-CM

## 2021-08-05 PROCEDURE — 99204 OFFICE O/P NEW MOD 45 MIN: CPT | Performed by: FAMILY MEDICINE

## 2021-08-05 ASSESSMENT — MIFFLIN-ST. JEOR: SCORE: 1856.78

## 2021-08-05 ASSESSMENT — PAIN SCALES - GENERAL: PAINLEVEL: NO PAIN (0)

## 2021-08-05 NOTE — PROGRESS NOTES
[address] [address]  Phone: [phone]  Primary Provider: [facility]  Pre-op Performing Provider: GUY SERNA      PREOPERATIVE EVALUATION:  Today's date: 8/5/2021    Arslan Mixon is a 51 year old male who presents for a preoperative evaluation.    Surgical Information:  Surgery/Procedure: Inspire Sleep Apnea [place]  Surgery Location: [facility]  Surgeon: Feroz  Surgery Date: 8/16/2021  Time of Surgery: TBD  Where patient plans to recover: At home with family  Fax number for surgical facility: [phone]    Type of Anesthesia Anticipated: to be determined    Assessment & Plan     The proposed surgical procedure is considered LOW risk.    Preop general physical exam    - Asymptomatic COVID-19 Virus (Coronavirus) by PCR; Future    KARISSA (obstructive sleep apnea)- moderate (AHI 15)  On CPAP, continue to have problems sleeping at night    Malignant neoplasm of urinary bladder, unspecified site (H)  In remission.    Encounter for screening for COVID-19    - Asymptomatic COVID-19 Virus (Coronavirus) by PCR; Future      Risks and Recommendations:  The patient has the following additional risks and recommendations for perioperative complications:  Obstructive Sleep Apnea:   On CPAP    Medication Instructions:  Patient is on no chronic medications    RECOMMENDATION:  APPROVAL GIVEN to proceed with proposed procedure, without further diagnostic evaluation.        Subjective     HPI related to upcoming procedure: Patient with history of sleep apnea, he is currently on a CPAP machine.  However, he reported to have a hard time sleeping or kept on his face.  Otherwise,  he has no active chronic medical history.  He has no heart disease, no chest pain, shortness of breath, no history of bleeding    Preop Questions 8/5/2021   1. Have you ever had a heart attack or stroke? No   2. Have you ever had surgery on  your heart or blood vessels, such as a stent placement, a coronary artery bypass, or surgery on an artery in your head, neck, heart, or legs? No   3. Do you have chest pain with activity? No   4. Do you have a history of  heart failure? No   5. Do you currently have a cold, bronchitis or symptoms of other infection? No   6. Do you have a cough, shortness of breath, or wheezing? No   7. Do you or anyone in your family have previous history of blood clots? No   8. Do you or does anyone in your family have a serious bleeding problem such as prolonged bleeding following surgeries or cuts? No   9. Have you ever had problems with anemia or been told to take iron pills? No   10. Have you had any abnormal blood loss such as black, tarry or bloody stools? No   11. Have you ever had a blood transfusion? No   12. Are you willing to have a blood transfusion if it is medically needed before, during, or after your surgery? Yes   13. Have you or any of your relatives ever had problems with anesthesia? No   14. Do you have sleep apnea, excessive snoring or daytime drowsiness? YES - on CPAP   14a. Do you have a CPAP machine? Yes   15. Do you have any artifical heart valves or other implanted medical devices like a pacemaker, defibrillator, or continuous glucose monitor? No   16. Do you have artificial joints? No   17. Are you allergic to latex? No       Health Care Directive:  Patient does not have a Health Care Directive or Living Will: N/A    Preoperative Review of :   reviewed - no record of controlled substances prescribed.      Status of Chronic Conditions:  See problem list for active medical problems.  Problems all longstanding and stable, except as noted/documented.  See ROS for pertinent symptoms related to these conditions.      Review of Systems  CONSTITUTIONAL: NEGATIVE for fever, chills, change in weight  INTEGUMENTARY/SKIN: NEGATIVE for worrisome rashes, moles or lesions  EYES: NEGATIVE for vision changes or  irritation  ENT/MOUTH: NEGATIVE for ear, mouth and throat problems  RESP: NEGATIVE for significant cough or SOB  CV: NEGATIVE for chest pain, palpitations or peripheral edema  GI: NEGATIVE for nausea, abdominal pain, heartburn, or change in bowel habits  : NEGATIVE for frequency, dysuria, or hematuria  MUSCULOSKELETAL: NEGATIVE for significant arthralgias or myalgia  NEURO: NEGATIVE for weakness, dizziness or paresthesias  ENDOCRINE: NEGATIVE for temperature intolerance, skin/hair changes  HEME: NEGATIVE for bleeding problems  PSYCHIATRIC: NEGATIVE for changes in mood or affect    Patient Active Problem List    Diagnosis Date Noted     KARISSA (obstructive sleep apnea)- moderate (AHI 15) 07/23/2015     Priority: Medium     Study Date: 7/21/2015- (200.0 lb)  The lowest oxygen saturation was 77.6%. Apnea/Hypopnea Index was 15.0 events per hour.  The REM AHI was 45.0.  The RERA index was 0.9 per hour.   The RDI was 15.9.  The optimal pressure was 12 with an AHI of 1.8. Supine REM was seen at this pressure. Titrations were mostly done for airflow limitations and snoring. Lower pressures may be effective.       Varicocele 07/11/2013     Priority: Medium     CARDIOVASCULAR SCREENING; LDL GOAL LESS THAN 130 07/11/2013     Priority: Medium     Back pain 07/11/2013     Priority: Medium      History reviewed. No pertinent past medical history.  Past Surgical History:   Procedure Laterality Date     HERNIA REPAIR      umbilical, age 3     VASECTOMY  2014     Current Outpatient Medications   Medication Sig Dispense Refill     nicotine (NICODERM CQ) 21 MG/24HR 24 hr patch Place 1 patch onto the skin every 24 hours (Patient not taking: Reported on 5/1/2018) 30 patch 1     order for [facility] [employer] AutoCPAP 12cm H2O, [place] Fx standard nasal mask (Patient not taking: Reported on 8/5/2021)         No Known Allergies     Social History     Tobacco Use     Smoking status: Former Smoker     Packs/day: 1.00     Types: Cigarettes  "    Quit date: 2018     Years since quitting: 3.2     Smokeless tobacco: Never Used   Substance Use Topics     Alcohol use: Yes     Family History   Problem Relation Age of Onset     Breast Cancer Mother      Hypertension Mother      Cancer Maternal Grandmother         brain tumor     Cerebrovascular Disease Maternal Grandfather      Heart Disease Maternal Grandfather         3 MIs     Cancer Paternal Grandmother         kidney     Heart Disease Paternal Grandfather         MI,  age 56     Diabetes No family hx of      History   Drug Use No         Objective     /70 (BP Location: Right arm, Patient Position: Chair, Cuff Size: Adult Large)   Pulse 87   Temp 98.2  F (36.8  C) (Oral)   Resp 22   Ht 1.825 m (5' 11.85\")   Wt 96.6 kg (213 lb)   SpO2 96%   BMI 29.01 kg/m      Physical Exam    GENERAL APPEARANCE: healthy, alert and no distress     NECK: no adenopathy, no asymmetry, masses, or scars and thyroid normal to palpation     RESP: lungs clear to auscultation - no rales, rhonchi or wheezes     CV: regular rates and rhythm, normal S1 S2, no S3 or S4 and no murmur, click or rub     ABDOMEN:  soft, nontender, no HSM or masses and bowel sounds normal     MS: extremities normal- no gross deformities noted, no evidence of inflammation in joints, FROM in all extremities.     SKIN: no suspicious lesions or rashes     NEURO: Normal strength and tone, sensory exam grossly normal, mentation intact and speech normal     PSYCH: mentation appears normal. and affect normal/bright     LYMPHATICS: No cervical adenopathy    No results for input(s): HGB, PLT, INR, NA, POTASSIUM, CR, A1C in the last 21878 hours.     Diagnostics:  No labs were ordered during this visit.   No EKG required for low risk surgery (cataract, skin procedure, breast biopsy, etc).    Revised Cardiac Risk Index (RCRI):  The patient has the following serious cardiovascular risks for perioperative complications:   - No serious cardiac risks = " 0 points     RCRI Interpretation: 0 points: Class I (very low risk - 0.4% complication rate)        Signed Electronically by: Michele Shafer MD  Copy of this evaluation report is provided to requesting physician.

## 2021-08-05 NOTE — PATIENT INSTRUCTIONS

## 2021-08-12 ENCOUNTER — LAB (OUTPATIENT)
Dept: LAB | Facility: CLINIC | Age: 51
End: 2021-08-12
Attending: FAMILY MEDICINE
Payer: COMMERCIAL

## 2021-08-12 DIAGNOSIS — Z11.52 ENCOUNTER FOR SCREENING FOR COVID-19: ICD-10-CM

## 2021-08-12 DIAGNOSIS — Z01.818 PREOP GENERAL PHYSICAL EXAM: ICD-10-CM

## 2021-08-12 PROCEDURE — U0003 INFECTIOUS AGENT DETECTION BY NUCLEIC ACID (DNA OR RNA); SEVERE ACUTE RESPIRATORY SYNDROME CORONAVIRUS 2 (SARS-COV-2) (CORONAVIRUS DISEASE [COVID-19]), AMPLIFIED PROBE TECHNIQUE, MAKING USE OF HIGH THROUGHPUT TECHNOLOGIES AS DESCRIBED BY CMS-2020-01-R: HCPCS

## 2021-08-12 PROCEDURE — U0005 INFEC AGEN DETEC AMPLI PROBE: HCPCS

## 2021-08-13 LAB — SARS-COV-2 RNA RESP QL NAA+PROBE: NEGATIVE

## 2021-09-26 ENCOUNTER — HEALTH MAINTENANCE LETTER (OUTPATIENT)
Age: 51
End: 2021-09-26

## 2022-01-11 ENCOUNTER — OFFICE VISIT (OUTPATIENT)
Dept: UROLOGY | Facility: CLINIC | Age: 52
End: 2022-01-11
Payer: COMMERCIAL

## 2022-01-11 DIAGNOSIS — Z85.51 PERSONAL HISTORY OF MALIGNANT NEOPLASM OF BLADDER: Primary | ICD-10-CM

## 2022-01-11 PROCEDURE — 52000 CYSTOURETHROSCOPY: CPT | Performed by: UROLOGY

## 2022-01-11 NOTE — PROGRESS NOTES
S: Arslan Mixon is a 51 year old male returns for bladder cancer surveillance.    he has history of bladder cancer Grade 1 non-invasive, Stage ta, s/p turbt on 4/ 18.     He received 0 courses of BCG therapy.    Patient is draped and prepped.  Flexible cystoscopy placed under direct vision.    Cysto:  The anterior urethra is normal   The prostatic urethra is normal.     The length is 1cm,  the coaptation is 1 cm.     In the bladder there is normal mucosa.    Assessment/Plan:  (Z85.51) Personal history of malignant neoplasm of bladder  (primary encounter diagnosis)  Comment: no evidence of cancer recurrence  Plan: CYSTOURETHROSCOPY (17678)        In 12 months

## 2022-01-24 ENCOUNTER — OFFICE VISIT (OUTPATIENT)
Dept: FAMILY MEDICINE | Facility: CLINIC | Age: 52
End: 2022-01-24
Payer: COMMERCIAL

## 2022-01-24 VITALS
DIASTOLIC BLOOD PRESSURE: 86 MMHG | OXYGEN SATURATION: 98 % | HEART RATE: 82 BPM | BODY MASS INDEX: 28.49 KG/M2 | SYSTOLIC BLOOD PRESSURE: 110 MMHG | HEIGHT: 73 IN | TEMPERATURE: 97.5 F | WEIGHT: 215 LBS

## 2022-01-24 DIAGNOSIS — G47.33 OSA (OBSTRUCTIVE SLEEP APNEA): ICD-10-CM

## 2022-01-24 DIAGNOSIS — Z01.818 PREOP GENERAL PHYSICAL EXAM: Primary | ICD-10-CM

## 2022-01-24 DIAGNOSIS — C67.9 MALIGNANT NEOPLASM OF URINARY BLADDER, UNSPECIFIED SITE (H): ICD-10-CM

## 2022-01-24 LAB
ANION GAP SERPL CALCULATED.3IONS-SCNC: 2 MMOL/L (ref 3–14)
BUN SERPL-MCNC: 19 MG/DL (ref 7–30)
CALCIUM SERPL-MCNC: 9.1 MG/DL (ref 8.5–10.1)
CHLORIDE BLD-SCNC: 109 MMOL/L (ref 94–109)
CO2 SERPL-SCNC: 29 MMOL/L (ref 20–32)
CREAT SERPL-MCNC: 0.86 MG/DL (ref 0.66–1.25)
ERYTHROCYTE [DISTWIDTH] IN BLOOD BY AUTOMATED COUNT: 12.5 % (ref 10–15)
GFR SERPL CREATININE-BSD FRML MDRD: >90 ML/MIN/1.73M2
GLUCOSE BLD-MCNC: 103 MG/DL (ref 70–99)
HCT VFR BLD AUTO: 47.9 % (ref 40–53)
HGB BLD-MCNC: 16.2 G/DL (ref 13.3–17.7)
MCH RBC QN AUTO: 31.1 PG (ref 26.5–33)
MCHC RBC AUTO-ENTMCNC: 33.8 G/DL (ref 31.5–36.5)
MCV RBC AUTO: 92 FL (ref 78–100)
PLATELET # BLD AUTO: 196 10E3/UL (ref 150–450)
POTASSIUM BLD-SCNC: 4.1 MMOL/L (ref 3.4–5.3)
RBC # BLD AUTO: 5.21 10E6/UL (ref 4.4–5.9)
SODIUM SERPL-SCNC: 140 MMOL/L (ref 133–144)
WBC # BLD AUTO: 6.5 10E3/UL (ref 4–11)

## 2022-01-24 PROCEDURE — 36415 COLL VENOUS BLD VENIPUNCTURE: CPT | Performed by: NURSE PRACTITIONER

## 2022-01-24 PROCEDURE — 85027 COMPLETE CBC AUTOMATED: CPT | Performed by: NURSE PRACTITIONER

## 2022-01-24 PROCEDURE — 80048 BASIC METABOLIC PNL TOTAL CA: CPT | Performed by: NURSE PRACTITIONER

## 2022-01-24 PROCEDURE — 99214 OFFICE O/P EST MOD 30 MIN: CPT | Performed by: NURSE PRACTITIONER

## 2022-01-24 ASSESSMENT — MIFFLIN-ST. JEOR: SCORE: 1876.17

## 2022-01-24 NOTE — PATIENT INSTRUCTIONS
No Aspirin for one week prior to surgery.  No Naproxen (Aleve) for 3 days prior to surgery.  No ibuprofen (Motrin) for 1 day prior to surgery.    For pain, it is fine to use acetaminophen (Tylenol).    Tylenol (Acetominophen) Discharge Instructions:  You may take 2 tablets of regular strength, over-the-counter, Tylenol (acetaminophen) every 4-6 hours as needed for pain.  Take no more than 4000 mg of Tylenol in a 24-hour period.      Avoid taking more than 1 acetaminophen-containing product at a time and be aware that many over-the-counter medications contain a combination of acetaminophen and other products.  If you are taking Tylenol in addition to a combination product please keep track of your daily acetaminophen dose to make sure you do not exceed the recommended 4000 mg.  Taking too much acetaminophen can cause permanent damage to your liver.

## 2022-01-24 NOTE — PROGRESS NOTES
[facility]  [address] [address]  Phone: [phone]  Primary Provider: [facility]  Pre-op Performing Provider: BRADLEY THOMSON      PREOPERATIVE EVALUATION:  Today's date: 1/24/2022    Arslan Mixon is a 51 year old male who presents for a preoperative evaluation.    Surgical Information:   Surgery/Procedure: RIGHT INSPIRE HYPOGLOSSAL NERVE STIMULATOR PLACEMENT    Surgery Location: [facility]  Surgeon: Dr. Jeff-  Otolaryngology  Surgery Date: 1/26/22  Time of Surgery: 0730  Where patient plans to recover: At home with family  Fax number for surgical facility: Note does not need to be faxed, will be available electronically in Epic.    Type of Anesthesia Anticipated: to be determined/ [patient doesn't know.    Assessment & Plan     The proposed surgical procedure is considered INTERMEDIATE risk.    Preop general physical exam  Cleared for proposed procedure.  - CBC with platelets; Future  - Basic metabolic panel  (Ca, Cl, CO2, Creat, Gluc, K, Na, BUN); Future      KARISSA (obstructive sleep apnea)- moderate (AHI 15)      Malignant neoplasm of urinary bladder, unspecified site (H)   Followed by urology, in remission.    Risks and Recommendations:  The patient has the following additional risks and recommendations for perioperative complications:   - No identified additional risk factors other than previously addressed    Medication Instructions:  Patient is on no chronic medications    RECOMMENDATION:  APPROVAL GIVEN to proceed with proposed procedure, without further diagnostic evaluation.      Subjective     HPI related to upcoming procedure: history of KARISSA on CPAP, recent sleep study still identified SpO2 dropping into 70's, chronic sinus issues related to CPAP. Procedure for implantable device to assist with KARISSA.    Preop Questions 1/24/2022   1. Have you ever had a heart attack or stroke? No   2. Have you ever had  surgery on your heart or blood vessels, such as a stent placement, a coronary artery bypass, or surgery on an artery in your head, neck, heart, or legs? No   3. Do you have chest pain with activity? No   4. Do you have a history of  heart failure? No   5. Do you currently have a cold, bronchitis or symptoms of other infection? No   6. Do you have a cough, shortness of breath, or wheezing? No   7. Do you or anyone in your family have previous history of blood clots? No   8. Do you or does anyone in your family have a serious bleeding problem such as prolonged bleeding following surgeries or cuts? No   9. Have you ever had problems with anemia or been told to take iron pills? No   10. Have you had any abnormal blood loss such as black, tarry or bloody stools? No   11. Have you ever had a blood transfusion? No   12. Are you willing to have a blood transfusion if it is medically needed before, during, or after your surgery? Yes   13. Have you or any of your relatives ever had problems with anesthesia? No   14. Do you have sleep apnea, excessive snoring or daytime drowsiness? YES - known KARISSA on CPAP   14a. Do you have a CPAP machine? Yes   15. Do you have any artifical heart valves or other implanted medical devices like a pacemaker, defibrillator, or continuous glucose monitor? No   16. Do you have artificial joints? No   17. Are you allergic to latex? No       Health Care Directive:  Patient does not have a Health Care Directive or Living Will: Discussed advance care planning with patient; however, patient declined at this time.    Preoperative Review of :   reviewed - no record of controlled substances prescribed.      Status of Chronic Conditions:  See problem list for active medical problems.  Problems all longstanding and stable, except as noted/documented.  See ROS for pertinent symptoms related to these conditions.      Review of Systems  CONSTITUTIONAL: NEGATIVE for fever, chills, change in  "weight  INTEGUMENTARY/SKIN: NEGATIVE for worrisome rashes, moles or lesions  EYES: NEGATIVE for vision changes or irritation  ENT/MOUTH: NEGATIVE for ear, mouth and throat problems  RESP: NEGATIVE for significant cough or SOB  CV: NEGATIVE for chest pain, palpitations or peripheral edema  GI: NEGATIVE for nausea, abdominal pain, heartburn, or change in bowel habits  : NEGATIVE for frequency, dysuria, or hematuria  MUSCULOSKELETAL: NEGATIVE for significant arthralgias or myalgia  NEURO: NEGATIVE for weakness, dizziness or paresthesias  ENDOCRINE: NEGATIVE for temperature intolerance, skin/hair changes  HEME: NEGATIVE for bleeding problems  PSYCHIATRIC: NEGATIVE for changes in mood or affect         Past Surgical History:   Procedure Laterality Date     CYSTOSCOPY       HERNIA REPAIR      umbilical, age 3     VASECTOMY  2014     No current outpatient medications on file.       No Known Allergies     Social History     Tobacco Use     Smoking status: Former Smoker     Packs/day: 1.00     Types: Cigarettes     Quit date: 2018     Years since quitting: 3.7     Smokeless tobacco: Never Used   Substance Use Topics     Alcohol use: Yes     Family History   Problem Relation Age of Onset     Breast Cancer Mother      Hypertension Mother      Cancer Maternal Grandmother         brain tumor     Cerebrovascular Disease Maternal Grandfather      Heart Disease Maternal Grandfather         3 MIs     Cancer Paternal Grandmother         kidney     Heart Disease Paternal Grandfather         MI,  age 56     Diabetes No family hx of      History   Drug Use No         Objective     /86 (BP Location: Right arm, Cuff Size: Adult Large)   Pulse 82   Temp 97.5  F (36.4  C) (Tympanic)   Ht 1.842 m (6' 0.5\")   Wt 97.5 kg (215 lb)   SpO2 98%   BMI 28.76 kg/m      Physical Exam    GENERAL APPEARANCE: healthy, alert and no distress     EYES: EOMI,  PERRL     HENT: ear canals and TM's normal and nose and mouth without ulcers " or lesions     NECK: no adenopathy, no asymmetry, masses, or scars and thyroid normal to palpation     RESP: lungs clear to auscultation - no rales, rhonchi or wheezes     CV: regular rates and rhythm, normal S1 S2, no S3 or S4 and no murmur, click or rub     ABDOMEN:  soft, nontender, no HSM or masses and bowel sounds normal     MS: extremities normal- no gross deformities noted, no evidence of inflammation in joints, FROM in all extremities.     SKIN: no suspicious lesions or rashes     NEURO: Normal strength and tone, sensory exam grossly normal, mentation intact and speech normal     PSYCH: mentation appears normal. and affect normal/bright     LYMPHATICS: No cervical adenopathy    No results for input(s): HGB, PLT, INR, NA, POTASSIUM, CR, A1C in the last 59768 hours.     Diagnostics:  Labs pending at this time.  Results will be reviewed when available.   No EKG required, no history of coronary heart disease, significant arrhythmia, peripheral arterial disease or other structural heart disease.    Revised Cardiac Risk Index (RCRI):  The patient has the following serious cardiovascular risks for perioperative complications:   - No serious cardiac risks = 0 points     RCRI Interpretation: 0 points: Class I (very low risk - 0.4% complication rate)           Signed Electronically by: JOSE Goff CNP  Copy of this evaluation report is provided to requesting physician.

## 2022-02-02 ENCOUNTER — TRANSFERRED RECORDS (OUTPATIENT)
Dept: HEALTH INFORMATION MANAGEMENT | Facility: CLINIC | Age: 52
End: 2022-02-02
Payer: COMMERCIAL

## 2022-05-08 ENCOUNTER — HEALTH MAINTENANCE LETTER (OUTPATIENT)
Age: 52
End: 2022-05-08

## 2023-01-10 ENCOUNTER — OFFICE VISIT (OUTPATIENT)
Dept: UROLOGY | Facility: CLINIC | Age: 53
End: 2023-01-10
Payer: COMMERCIAL

## 2023-01-10 DIAGNOSIS — Z85.51 PERSONAL HISTORY OF MALIGNANT NEOPLASM OF BLADDER: Primary | ICD-10-CM

## 2023-01-10 PROCEDURE — 52000 CYSTOURETHROSCOPY: CPT | Performed by: UROLOGY

## 2023-01-10 NOTE — PROGRESS NOTES
S: Arslan Mixon is a 52 year old male returns for bladder cancer surveillance.    he has history of bladder cancer Grade 1 non-invasive, Stage ta, s/p turbt on 4/ 18.     He received 0 courses of BCG therapy.    Patient is draped and prepped.  Flexible cystoscopy placed under direct vision.    Cysto:  The anterior urethra is normal   The prostatic urethra is normal.     The length is 1cm,  the coaptation is 1 cm.     In the bladder there is normal mucosa.    Assessment/Plan:  (Z85.51) Personal history of malignant neoplasm of bladder  (primary encounter diagnosis)  Comment: no evidence of cancer recurrence  Plan: CYSTOURETHROSCOPY (90418)        In 12 months

## 2023-01-14 ENCOUNTER — HEALTH MAINTENANCE LETTER (OUTPATIENT)
Age: 53
End: 2023-01-14

## 2023-06-02 ENCOUNTER — HEALTH MAINTENANCE LETTER (OUTPATIENT)
Age: 53
End: 2023-06-02

## 2023-12-15 ENCOUNTER — OFFICE VISIT (OUTPATIENT)
Dept: FAMILY MEDICINE | Facility: CLINIC | Age: 53
End: 2023-12-15
Payer: COMMERCIAL

## 2023-12-15 ENCOUNTER — ANCILLARY PROCEDURE (OUTPATIENT)
Dept: GENERAL RADIOLOGY | Facility: CLINIC | Age: 53
End: 2023-12-15
Attending: PREVENTIVE MEDICINE
Payer: COMMERCIAL

## 2023-12-15 VITALS
OXYGEN SATURATION: 96 % | HEART RATE: 99 BPM | HEIGHT: 74 IN | RESPIRATION RATE: 16 BRPM | TEMPERATURE: 102 F | WEIGHT: 192.2 LBS | SYSTOLIC BLOOD PRESSURE: 128 MMHG | DIASTOLIC BLOOD PRESSURE: 71 MMHG | BODY MASS INDEX: 24.67 KG/M2

## 2023-12-15 DIAGNOSIS — C67.9 MALIGNANT NEOPLASM OF URINARY BLADDER, UNSPECIFIED SITE (H): ICD-10-CM

## 2023-12-15 DIAGNOSIS — R50.9 HIGH FEVER: ICD-10-CM

## 2023-12-15 DIAGNOSIS — R50.9 HIGH FEVER: Primary | ICD-10-CM

## 2023-12-15 LAB
FLUAV RNA SPEC QL NAA+PROBE: POSITIVE
FLUBV RNA RESP QL NAA+PROBE: NEGATIVE
RSV RNA SPEC NAA+PROBE: NEGATIVE
SARS-COV-2 RNA RESP QL NAA+PROBE: NEGATIVE

## 2023-12-15 PROCEDURE — 87637 SARSCOV2&INF A&B&RSV AMP PRB: CPT | Performed by: PREVENTIVE MEDICINE

## 2023-12-15 PROCEDURE — 71046 X-RAY EXAM CHEST 2 VIEWS: CPT | Mod: TC | Performed by: RADIOLOGY

## 2023-12-15 PROCEDURE — 99213 OFFICE O/P EST LOW 20 MIN: CPT | Performed by: PREVENTIVE MEDICINE

## 2023-12-15 ASSESSMENT — ENCOUNTER SYMPTOMS: FEVER: 1

## 2023-12-15 NOTE — PATIENT INSTRUCTIONS
Tylenol 1000 mg every 8 hours   AND  Ibuprofen 600 mg every 6-8 hours  OK to take them together.

## 2023-12-15 NOTE — RESULT ENCOUNTER NOTE
Arslan,     Chest X ray is not showing any pneumonias or fluid in the lungs.     Please do not hesitate to call us at [phone] if you have any questions or concerns.    Thank you,    Johnna Faith MD MPH

## 2023-12-18 NOTE — RESULT ENCOUNTER NOTE
Arslan,     Test was positive for Influenza.  If you notice decreased urine output, sudden shortness of breath or chest pain, please be seen in the emergency room.     Please do not hesitate to call us at [phone] if you have any questions or concerns.    Thank you,    Johnna Faith MD MPH

## 2024-01-23 ENCOUNTER — OFFICE VISIT (OUTPATIENT)
Dept: UROLOGY | Facility: CLINIC | Age: 54
End: 2024-01-23
Payer: COMMERCIAL

## 2024-01-23 VITALS
HEART RATE: 90 BPM | SYSTOLIC BLOOD PRESSURE: 124 MMHG | OXYGEN SATURATION: 98 % | DIASTOLIC BLOOD PRESSURE: 86 MMHG | BODY MASS INDEX: 26.45 KG/M2 | WEIGHT: 203.2 LBS

## 2024-01-23 DIAGNOSIS — Z85.51 PERSONAL HISTORY OF MALIGNANT NEOPLASM OF BLADDER: Primary | ICD-10-CM

## 2024-01-23 PROCEDURE — 52000 CYSTOURETHROSCOPY: CPT | Performed by: UROLOGY

## 2024-01-23 NOTE — PROGRESS NOTES
S: Arslan Mixon is a 53 year old male returns for bladder cancer surveillance.    he has history of bladder cancer Grade 1 non-invasive, Stage ta, s/p turbt on 4/ 18.     He received 0 courses of BCG therapy.    Patient is draped and prepped.  Flexible cystoscopy placed under direct vision.    Cysto:  The anterior urethra is normal   The prostatic urethra is normal.     The length is 1cm,  the coaptation is 1 cm.     In the bladder there is normal mucosa.    Assessment/Plan:  (Z85.51) Personal history of malignant neoplasm of bladder  (primary encounter diagnosis)  Comment: no evidence of cancer recurrence  Plan: CYSTOURETHROSCOPY (90322)        In 12 months

## 2024-06-30 ENCOUNTER — HEALTH MAINTENANCE LETTER (OUTPATIENT)
Age: 54
End: 2024-06-30

## 2025-01-06 ENCOUNTER — ALLIED HEALTH/NURSE VISIT (OUTPATIENT)
Dept: FAMILY MEDICINE | Facility: CLINIC | Age: 55
End: 2025-01-06
Payer: COMMERCIAL

## 2025-01-06 DIAGNOSIS — Z23 ENCOUNTER FOR IMMUNIZATION: Primary | ICD-10-CM

## 2025-01-06 PROCEDURE — 90471 IMMUNIZATION ADMIN: CPT

## 2025-01-06 PROCEDURE — 90746 HEPB VACCINE 3 DOSE ADULT IM: CPT

## 2025-01-06 PROCEDURE — 99207 PR NO CHARGE NURSE ONLY: CPT

## 2025-01-06 PROCEDURE — 90715 TDAP VACCINE 7 YRS/> IM: CPT

## 2025-01-06 PROCEDURE — 90472 IMMUNIZATION ADMIN EACH ADD: CPT

## 2025-01-06 NOTE — PROGRESS NOTES
.Prior to immunization administration, verified patients identity using patient s name and date of birth. Please see Immunization Activity for additional information.     Screening Questionnaire for Adult Immunization    Are you sick today?   No   Do you have allergies to medications, food, a vaccine component or latex?   No   Have you ever had a serious reaction after receiving a vaccination?   No   Do you have a long-term health problem with heart, lung, kidney, or metabolic disease (e.g., diabetes), asthma, a blood disorder, no spleen, complement component deficiency, a cochlear implant, or a spinal fluid leak?  Are you on long-term aspirin therapy?   No   Do you have cancer, leukemia, HIV/AIDS, or any other immune system problem?   No   Do you have a parent, brother, or sister with an immune system problem?   No   In the past 3 months, have you taken medications that affect  your immune system, such as prednisone, other steroids, or anticancer drugs; drugs for the treatment of rheumatoid arthritis, Crohn s disease, or psoriasis; or have you had radiation treatments?   No   Have you had a seizure, or a brain or other nervous system problem?   No   During the past year, have you received a transfusion of blood or blood    products, or been given immune (gamma) globulin or antiviral drug?   No   For women: Are you pregnant or is there a chance you could become       pregnant during the next month?   No   Have you received any vaccinations in the past 4 weeks?   No     Immunization questionnaire answers were all negative.    I have reviewed the following standing orders:   This patient is due and qualifies for the Hepatitis B vaccine.    Click here for Hepatitis B Standing Order    I have reviewed the vaccines inclusion and exclusion criteria; No concerns regarding eligibility.         This patient is due and qualifies for a TDAP vaccine.    Click here for Tdap Standing Order    I have reviewed the vaccines  inclusion and exclusion criteria; No concerns regarding eligibility.     Patient instructed to remain in clinic for 15 minutes afterwards, and to report any adverse reactions.     Screening performed by Jess Garcia MA on 1/6/2025 at 2:16 PM.       Prior to immunization administration, verified patients identity using patient s name and date of birth. Please see Immunization Activity for additional information.     Is the patient's temperature normal (100.5 or less)? Yes     Patient MEETS CRITERIA. PROCEED with vaccine administration.      Patient instructed to remain in clinic for 15 minutes afterwards, and to report any adverse reactions.      Link to Ancillary Visit Immunization Standing Orders SmartSet     Screening performed by Jess Garcia MA on 1/6/2025 at 2:17 PM.

## 2025-02-11 ENCOUNTER — OFFICE VISIT (OUTPATIENT)
Dept: FAMILY MEDICINE | Facility: CLINIC | Age: 55
End: 2025-02-11
Payer: COMMERCIAL

## 2025-02-11 VITALS
BODY MASS INDEX: 27.67 KG/M2 | RESPIRATION RATE: 16 BRPM | TEMPERATURE: 98.4 F | DIASTOLIC BLOOD PRESSURE: 85 MMHG | WEIGHT: 208.8 LBS | HEIGHT: 73 IN | SYSTOLIC BLOOD PRESSURE: 127 MMHG | OXYGEN SATURATION: 95 % | HEART RATE: 69 BPM

## 2025-02-11 DIAGNOSIS — R41.840 ATTENTION AND CONCENTRATION DEFICIT: ICD-10-CM

## 2025-02-11 DIAGNOSIS — H92.02 OTALGIA, LEFT: Primary | ICD-10-CM

## 2025-02-11 PROCEDURE — 99213 OFFICE O/P EST LOW 20 MIN: CPT | Performed by: FAMILY MEDICINE

## 2025-02-11 ASSESSMENT — PAIN SCALES - GENERAL: PAINLEVEL_OUTOF10: MILD PAIN (2)

## 2025-02-11 NOTE — PROGRESS NOTES
Assessment & Plan     Otalgia, left  Normal exam today, no sign of infections.  Advised patient on supportive care, avoid using the Q-tips to clean ears.  May take ibuprofen as needed    Attention and concentration deficit    Patient concerns he may have ADHD, he reports he struggles with concentration and attention, poor attention to details.  He did finish high school with poor grades, never went to college.    Patient was referred to be tested and evaluated.  Advised with supportive care  - Adult Mental Health  Referral; Future      Subjective   Arslan is a 55 year old, presenting for the following health issues:  Otalgia (Left ear pain x2weeks) and Adhd (Has been experiencing adhd symptoms, wife has noticied he has been mopving around more often than usual, can't stay on task and go off starting another task.)    Patient concerned he may have ADHD, he has been having difficulty with concentration and attention.  Difficult for him to follow orders.  Never been diagnosed.  He did finish high school with poor grades, never with the cause.    No other family member, no children been diagnosed with ADHD.    He denies depression, denies generalized anxiety disorder, denies alcohol use.  He sleeps well at night.        2/11/2025     3:26 PM   Additional Questions   Roomed by Leslie HOPKINS MA     History of Present Illness       Reason for visit:  Ear ache and Ask about having ADHD  Symptom onset:  3-4 weeks ago  Symptoms include:  Pain in left ear  Symptom intensity:  Moderate  Symptom progression:  Improving  Had these symptoms before:  No  What makes it worse:  No  What makes it better:  No   He is taking medications regularly.         Review of Systems  Constitutional, HEENT, cardiovascular, pulmonary, GI, , musculoskeletal, neuro, skin, endocrine and psych systems are negative, except as otherwise noted.      Objective    /85 (BP Location: Right arm, Patient Position: Sitting, Cuff Size: Adult Large)  "  Pulse 69   Temp 98.4  F (36.9  C) (Oral)   Resp 16   Ht 1.854 m (6' 1\")   Wt 94.7 kg (208 lb 12.8 oz)   SpO2 95%   BMI 27.55 kg/m    Body mass index is 27.55 kg/m .  Physical Exam   GENERAL: alert and no distress  HENT: ear canals and TM's normal, nose and mouth without ulcers or lesions  NECK: no adenopathy, no asymmetry, masses, or scars  RESP: lungs clear to auscultation - no rales, rhonchi or wheezes  PSYCH: mentation appears normal, affect normal/bright    Orders Placed This Encounter   Procedures    Adult Mental Health  Referral            Signed Electronically by: Michele Shafer MD    "

## 2025-02-18 ENCOUNTER — OFFICE VISIT (OUTPATIENT)
Dept: UROLOGY | Facility: CLINIC | Age: 55
End: 2025-02-18
Payer: COMMERCIAL

## 2025-02-18 VITALS — SYSTOLIC BLOOD PRESSURE: 122 MMHG | HEART RATE: 98 BPM | OXYGEN SATURATION: 100 % | DIASTOLIC BLOOD PRESSURE: 81 MMHG

## 2025-02-18 DIAGNOSIS — Z85.51 PERSONAL HISTORY OF MALIGNANT NEOPLASM OF BLADDER: Primary | ICD-10-CM

## 2025-02-18 PROCEDURE — 52000 CYSTOURETHROSCOPY: CPT | Performed by: UROLOGY

## 2025-02-18 NOTE — PROGRESS NOTES
S: Arslan Mixon is a 55 year old male returns for bladder cancer surveillance.    he has history of bladder cancer Grade 1 non-invasive, Stage ta, s/p turbt on 4/ 18.     He received 0 courses of BCG therapy.    Patient is draped and prepped.  Flexible cystoscopy placed under direct vision.    Cysto:  The anterior urethra is normal   The prostatic urethra is normal.     The length is 1cm,  the coaptation is 1 cm.     In the bladder there is normal mucosa.    Assessment/Plan:  (Z85.51) Personal history of malignant neoplasm of bladder  (primary encounter diagnosis)  Comment: no evidence of cancer recurrence  Plan: CYSTOURETHROSCOPY (56551)        In 12 months

## 2025-03-31 ENCOUNTER — TELEPHONE (OUTPATIENT)
Dept: FAMILY MEDICINE | Facility: CLINIC | Age: 55
End: 2025-03-31
Payer: COMMERCIAL

## 2025-03-31 NOTE — TELEPHONE ENCOUNTER
Patient Quality Outreach    Patient is due for the following:   Physical Preventive Adult Physical      Topic Date Due    Pneumococcal Vaccine (1 of 1 - PCV) Never done    Zoster (Shingles) Vaccine (1 of 2) Never done    Flu Vaccine (1) Never done    COVID-19 Vaccine (1 - 2024-25 season) Never done    Hepatitis B Vaccine (2 of 3 - 19+ 3-dose series) 02/03/2025       Action(s) Taken:   Schedule a nurse only visit for immunizations    Type of outreach:    Sent [employer] message.    Questions for provider review:    None         Leslie Arango MA  Chart routed to None.

## 2025-07-13 ENCOUNTER — HEALTH MAINTENANCE LETTER (OUTPATIENT)
Age: 55
End: 2025-07-13

## 2025-07-15 ENCOUNTER — TELEPHONE (OUTPATIENT)
Dept: FAMILY MEDICINE | Facility: CLINIC | Age: 55
End: 2025-07-15
Payer: COMMERCIAL

## 2025-07-15 NOTE — TELEPHONE ENCOUNTER
Patient Quality Outreach    Patient is due for the following:   Colon Cancer Screening  Physical Preventive Adult Physical    Action(s) Taken:   Schedule a Adult Preventative    Type of outreach:    Sent [employer] message.    Questions for provider review:    None         Lee Ann Avila CMA  Chart routed to None.